# Patient Record
Sex: FEMALE | Race: WHITE | Employment: FULL TIME | URBAN - METROPOLITAN AREA
[De-identification: names, ages, dates, MRNs, and addresses within clinical notes are randomized per-mention and may not be internally consistent; named-entity substitution may affect disease eponyms.]

---

## 2017-03-21 ENCOUNTER — HOSPITAL ENCOUNTER (OUTPATIENT)
Dept: MAMMOGRAPHY | Age: 72
Discharge: HOME | End: 2017-03-21
Attending: FAMILY MEDICINE
Payer: COMMERCIAL

## 2017-03-21 DIAGNOSIS — Z78.0 POSTMENOPAUSAL: ICD-10-CM

## 2017-03-21 PROCEDURE — 77080 DXA BONE DENSITY AXIAL: CPT

## 2017-09-20 ENCOUNTER — HOSPITAL ENCOUNTER (OUTPATIENT)
Dept: PHYSICAL THERAPY | Age: 72
Discharge: HOME | End: 2017-09-20
Attending: FAMILY MEDICINE
Payer: COMMERCIAL

## 2017-09-20 DIAGNOSIS — G89.29 CHRONIC MIDLINE LOW BACK PAIN WITHOUT SCIATICA: ICD-10-CM

## 2017-09-20 DIAGNOSIS — M54.50 CHRONIC MIDLINE LOW BACK PAIN WITHOUT SCIATICA: ICD-10-CM

## 2017-09-20 PROCEDURE — 97162 PT EVAL MOD COMPLEX 30 MIN: CPT

## 2017-09-20 PROCEDURE — 97110 THERAPEUTIC EXERCISES: CPT

## 2017-09-20 NOTE — PROGRESS NOTES
Ambulatory/Rehab Services H2 Model Falls Risk Assessment    Risk Factor Pts. ·   Confusion/Disorientation/Impulsivity  []    4 ·   Symptomatic Depression  []   2 ·   Altered Elimination  []   1 ·   Dizziness/Vertigo  []   1 ·   Gender (Male)  []   1 ·   Any administered antiepileptics (anticonvulsants):  []   2 ·   Any administered benzodiazepines:  []   1 ·   Visual Impairment (specify):  []   1 ·   Portable Oxygen Use  []   1 ·   Orthostatic ? BP  []   1 ·   History of Recent Falls (within 3 mos.)  []   5     Ability to Rise from Chair (choose one) Pts. ·   Ability to rise in a single movement  [x]   0 ·   Pushes up, successful in one attempt  []   1 ·   Multiple attempts, but successful  []   3 ·   Unable to rise without assistance  []   4   Total: (5 or greater = High Risk) 0     Falls Prevention Plan:   []                Physical Limitations to Exercise (specify):   []                Mobility Assistance Device (type):   []                Exercise/Equipment Adaptation (specify):    ©2010 [facility] Patent #[id number].  Federal Law prohibits the replication, distribution or use without written permission from [facility] [employer]

## 2017-09-20 NOTE — PROGRESS NOTES
Pastor Chavarria  : 1945 [facility]  [address], [address], [place]  [phone]   [phone]         OUTPATIENT PHYSICAL THERAPY:Initial Assessment and Daily Note 2017    ICD-10: Treatment Diagnosis:Low back pain (M54.5); Muscle weakness (generalized) (M62.81); Unsteadiness on feet (R26.81)  Precautions/Allergies:   Review of patient's allergies indicates no known allergies. Fall Risk Score: 0 (? 5 = High Risk)  MD Orders: PT eval and tx MEDICAL/REFERRING DIAGNOSIS:  Chronic midline low back pain without sciatica [M54.5, G89.29]   DATE OF ONSET: Late   REFERRING PHYSICIAN: Mark Medina MD  RETURN PHYSICIAN APPOINTMENT: 6 months     INITIAL ASSESSMENT:  Ms. Savana Mendez presents to PT eval w/ c/o LBP that has been bothering her since she was in her 's. She has a history of a discectomy in  and her most recent MRI shows degenerative changes w/ stenosis. She displayed decreased lumbar motion, decreased balance, and decreased B LE strength. She will benefit from continued skilled PT services to improve her deficits listed below and to progress towards her PLOF. PROBLEM LIST (Impacting functional limitations):  1. Decreased Strength  2. Decreased ADL/Functional Activities  3. Decreased Transfer Abilities  4. Decreased Balance  5. Increased Pain  6. Decreased Activity Tolerance  7. Decreased Flexibility/Joint Mobility  8. Decreased [place] with Home Exercise Program INTERVENTIONS PLANNED:  1. Balance Exercise  2. Cold  3. Electrical Stimulation  4. Heat  5. Home Exercise Program (HEP)  6. Manual Therapy  7. Range of Motion (ROM)  8. Therapeutic Activites  9. Therapeutic Exercise/Strengthening   TREATMENT PLAN:  Effective Dates: 17 TO 18.   Frequency/Duration: 1 time a week for 8 weeks  GOALS: (Goals have been discussed and agreed upon with patient.)  Discharge Goals: Time Frame: 8 weeks  Pt will be independent w/ HEP in order to improve outcomes and decrease pain. Pt will report pain of 3/10 or less while walking in grocery store in order to improve functional mobility. Pt will have Oswestry score of 23 or less in order to report decreased pain and decreased functional impairments. Pt will have B LE strength of 4/5 or greater in all major muscle groups in order to increase her safety w/ functional mobility. Rehabilitation Potential For Stated Goals: Good  Regarding [place] Georgiana Winn's therapy, I certify that the treatment plan above will be carried out by a therapist or under their direction. Thank you for this referral,  Yandel Grove PT     Referring Physician Signature: Lisa Bahena MD              Date                    The information in this section was collected on 9/20/17 (except where otherwise noted). HISTORY:   History of Present Injury/Illness (Reason for Referral):  Pt has been having back pain since she was in her 19's. She had a discectomy in 1993 and her most recent MRI shows degenerative changes w/ stenosis. Past Medical History/Comorbidities:   Ms. Tito Sinha  has a past medical history of Chronic musculoskeletal pain; COPD; GERD (gastroesophageal reflux disease); GERD (gastroesophageal reflux disease); History of back surgery; History of colon polyps; History of left bundle branch block; History of tooth extraction; History of tubal ligation; Hypertension; Hypothyroidism; Menopause; OA (osteoarthritis); and Visit for dental examination (02/2017). She also has no past medical history of Difficult intubation; Malignant hyperthermia due to anesthesia; Nausea & vomiting; or Pseudocholinesterase deficiency. Ms. Tito Sinha  has a past surgical history that includes back surgery; heent; tubal ligation; colsc flx w/removal lesion by hot bx forceps (9/13/2013); colsc flx w/rmvl of tumor polyp lesion snare tq (12/19/2016); and colonoscopy (N/A, 12/19/2016).   Social History/Living Environment:     Lives w/ spouse in 1 story home w/ tub/shower combo. She has 6 steps to enter her home. Prior Level of Function/Work/Activity:  Works - clerical computer job full time   Dominant Side:         RIGHT    Current Medications:       Current Outpatient Prescriptions:     naproxen (NAPROSYN) 500 mg tablet, Take 1 Tab by mouth two (2) times daily (with meals). , Disp: 60 Tab, Rfl: 11    losartan (COZAAR) 100 mg tablet, Take 1 Tab by mouth daily for 90 days. , Disp: 90 Tab, Rfl: 3    levothyroxine (SYNTHROID) 150 mcg tablet, Take 1 Tab by mouth Daily (before breakfast). , Disp: 90 Tab, Rfl: 1    alendronate (FOSAMAX) 70 mg tablet, Take 1 Tab by mouth every seven (7) days. Indications: POST-MENOPAUSAL OSTEOPOROSIS, Disp: 12 Tab, Rfl: 3    atenolol (TENORMIN) 50 mg tablet, Take 2 Tabs by mouth daily for 90 days. , Disp: 180 Tab, Rfl: 3    BIOTIN PO, Take  by mouth., Disp: , Rfl:     omeprazole (PRILOSEC) 20 mg capsule, Take 20 mg by mouth every morning. Indications: GASTROESOPHAGEAL REFLUX, Disp: , Rfl:     multivitamin (ONE A DAY) tablet, Take 1 Tab by mouth nightly. Last 9/9/13, Disp: , Rfl:     aspirin 81 mg tablet, Take 81 mg by mouth every morning. Last 9/10/13, Disp: , Rfl:     calcium 500 mg Tab, Take  by mouth daily. , Disp: , Rfl:     ascorbic acid (VITAMIN C) 500 mg tablet, Take  by mouth daily. , Disp: , Rfl:    Date Last Reviewed:  9/20/2017   Number of Personal Factors/Comorbidities that affect the Plan of Care: 1-2: MODERATE COMPLEXITY   EXAMINATION:   Observation/Orthostatic Postural Assessment: Forward flexed in stand and sit, rounded shoulders  Palpation:          Tender in R hamstring and behind knee  Functional Mobility:         Gait/Ambulation:   Forward flexed, slow, antalgic        Transfers:  Able to rise w/o UE assist         Bed Mobility:  Extra time and effort required   Balance:          Decreased on B LEs <10sec   Sensation:         Decreased on L medial malleolus, otherwise intact w/ light touch to B LEs     Joint/Muscle ROM Strength    Hip flexion WFL R 4-/5, L 4/5    Hip extension Decreased R 3/5, L 3+/5    Hip abduction WFL R 4-/5, L 4/5    Knee flexion WFL 5/5 R and L    Knee extension 35* R and L hamstring stretch test 5/5 R and L    DF WFL 5/5 R and L    Lumbar Decreased extension Decreased core strength          Body Structures Involved:  1. Nerves  2. Bones  3. Joints  4. Muscles Body Functions Affected:  1. Sensory/Pain  2. Neuromusculoskeletal  3. Movement Related Activities and Participation Affected:  1. General Tasks and Demands  2. Mobility  3. Self Care  4. Domestic Life  5. Interpersonal Interactions and Relationships  6. Community, Social and [place]   Number of elements (examined above) that affect the Plan of Care: 3: MODERATE COMPLEXITY   CLINICAL PRESENTATION:   Presentation: Evolving clinical presentation with changing clinical characteristics: MODERATE COMPLEXITY   CLINICAL DECISION MAKING:   Outcome Measure: Tool Used: Modified Oswestry Low Back Pain Questionnaire  Score:  Initial: 28/50  Most Recent: X/50 (Date: -- )   Interpretation of Score: Each section is scored on a 0-5 scale, 5 representing the greatest disability. The scores of each section are added together for a total score of 50. Score 0 1-10 11-20 21-30 31-40 41-49 50   Modifier CH CI CJ CK CL CM CN     ? Mobility - Walking and Moving Around:     - CURRENT STATUS: CK - 40%-59% impaired, limited or restricted    - GOAL STATUS: CK - 40%-59% impaired, limited or restricted    - D/C STATUS:  ---------------To be determined---------------      Medical Necessity:   · Patient is expected to demonstrate progress in strength, range of motion and balance to increase independence with functional tasks. Reason for Services/Other Comments:  · Patient continues to demonstrate capacity to improve strength, ROM, balance which will increase independence.    Use of outcome tool(s) and clinical judgement create a POC that gives a: Questionable prediction of patient's progress: MODERATE COMPLEXITY            TREATMENT:   (In addition to Assessment/Re-Assessment sessions the following treatments were rendered)  Pre-treatment Symptoms/Complaints:  See above. Pain: Initial:   1/10 Post Session:  1/10     THERAPEUTIC EXERCISE: (15 minutes):  Exercises per grid below to improve mobility, strength and balance. Required minimal verbal and manual cues to promote proper body alignment, promote proper body posture and promote proper body mechanics. Progressed resistance, range, repetitions and complexity of movement as indicated. Date:  9/20/17 Date:   Date:     Activity/Exercise Parameters Parameters Parameters   HEP Issued      Repeated flexion in supine 10x2 (stronger)     Repeated flexion in sit 10x5 (strongest)     Repeated flexion in stand 10x1 (not the best)     Lower trunk rotation 10x each side     TA contraction 10x     Hamstring stretch 3x30 sec each side         Treatment/Session Assessment:    · Response to Treatment:  Pt's hip flexion strength increased the most w/ seated lumbar flexion. She displayed tightness w/ hamstring stretching this session. · Compliance with Program/Exercises: Will assess as treatment progresses. · Recommendations/Intent for next treatment session: \"Next visit will focus on advancements to more challenging activities\".   Variance from POC:  none  PT Patient Time In/Time Out  Time In: 0735  Time Out: 0830    Steve Diamond PT

## 2017-09-22 ENCOUNTER — APPOINTMENT (OUTPATIENT)
Dept: PHYSICAL THERAPY | Age: 72
End: 2017-09-22
Attending: FAMILY MEDICINE
Payer: COMMERCIAL

## 2017-09-25 ENCOUNTER — APPOINTMENT (OUTPATIENT)
Dept: PHYSICAL THERAPY | Age: 72
End: 2017-09-25
Attending: FAMILY MEDICINE
Payer: COMMERCIAL

## 2017-09-27 ENCOUNTER — HOSPITAL ENCOUNTER (OUTPATIENT)
Dept: PHYSICAL THERAPY | Age: 72
Discharge: HOME | End: 2017-09-27
Attending: FAMILY MEDICINE
Payer: COMMERCIAL

## 2017-09-27 PROCEDURE — 97012 MECHANICAL TRACTION THERAPY: CPT

## 2017-09-27 PROCEDURE — 97110 THERAPEUTIC EXERCISES: CPT

## 2017-09-27 NOTE — PROGRESS NOTES
Bonnie Chester  : 1945 [facility]  [address], [address], [place] [address]  [phone]   [phone]         OUTPATIENT PHYSICAL THERAPY:Initial Assessment and Daily Note 2017    ICD-10: Treatment Diagnosis:Low back pain (M54.5); Muscle weakness (generalized) (M62.81); Unsteadiness on feet (R26.81)  Precautions/Allergies:   Review of patient's allergies indicates no known allergies. Fall Risk Score: 0 (? 5 = High Risk)  MD Orders: PT eval and tx MEDICAL/REFERRING DIAGNOSIS:  low back pain   DATE OF ONSET: Late   REFERRING PHYSICIAN: Karine Barrios MD  RETURN PHYSICIAN APPOINTMENT: 6 months     INITIAL ASSESSMENT:  Ms. Derrek Rodriguez presents to PT eval w/ c/o LBP that has been bothering her since she was in her 21's. She has a history of a discectomy in  and her most recent MRI shows degenerative changes w/ stenosis. She displayed decreased lumbar motion, decreased balance, and decreased B LE strength. She will benefit from continued skilled PT services to improve her deficits listed below and to progress towards her PLOF. PROBLEM LIST (Impacting functional limitations):  1. Decreased Strength  2. Decreased ADL/Functional Activities  3. Decreased Transfer Abilities  4. Decreased Balance  5. Increased Pain  6. Decreased Activity Tolerance  7. Decreased Flexibility/Joint Mobility  8. Decreased [place] with Home Exercise Program INTERVENTIONS PLANNED:  1. Balance Exercise  2. Cold  3. Electrical Stimulation  4. Heat  5. Home Exercise Program (HEP)  6. Manual Therapy  7. Range of Motion (ROM)  8. Therapeutic Activites  9. Therapeutic Exercise/Strengthening   TREATMENT PLAN:  Effective Dates: 17 TO 18. Frequency/Duration: 1 time a week for 8 weeks  GOALS: (Goals have been discussed and agreed upon with patient.)  Discharge Goals: Time Frame: 8 weeks  Pt will be independent w/ HEP in order to improve outcomes and decrease pain.    Pt will report pain of 3/10 or less while walking in grocery store in order to improve functional mobility. Pt will have Oswestry score of 23 or less in order to report decreased pain and decreased functional impairments. Pt will have B LE strength of 4/5 or greater in all major muscle groups in order to increase her safety w/ functional mobility. Rehabilitation Potential For Stated Goals: Good  Regarding Brinda Kawasaki Orgeron's therapy, I certify that the treatment plan above will be carried out by a therapist or under their direction. Thank you for this referral,  Benjamín Siddiqi PT     Referring Physician Signature: Justin Schafer MD              Date                    The information in this section was collected on 9/20/17 (except where otherwise noted). HISTORY:   History of Present Injury/Illness (Reason for Referral):  Pt has been having back pain since she was in her 21's. She had a discectomy in 1993 and her most recent MRI shows degenerative changes w/ stenosis. Past Medical History/Comorbidities:   Ms. Nikkie Weathers  has a past medical history of Chronic musculoskeletal pain; COPD; GERD (gastroesophageal reflux disease); GERD (gastroesophageal reflux disease); History of back surgery; History of colon polyps; History of left bundle branch block; History of tooth extraction; History of tubal ligation; Hypertension; Hypothyroidism; Menopause; OA (osteoarthritis); and Visit for dental examination (02/2017). She also has no past medical history of Difficult intubation; Malignant hyperthermia due to anesthesia; Nausea & vomiting; or Pseudocholinesterase deficiency. Ms. Nikkie Weathers  has a past surgical history that includes back surgery; heent; tubal ligation; colsc flx w/removal lesion by hot bx forceps (9/13/2013); colsc flx w/rmvl of tumor polyp lesion snare tq (12/19/2016); and colonoscopy (N/A, 12/19/2016). Social History/Living Environment:     Lives w/ spouse in 1 story home w/ tub/shower combo.  She has 6 steps to enter her home. Prior Level of Function/Work/Activity:  Works - clerical computer job full time   Dominant Side:         RIGHT    Current Medications:       Current Outpatient Prescriptions:     naproxen (NAPROSYN) 500 mg tablet, Take 1 Tab by mouth two (2) times daily (with meals). , Disp: 60 Tab, Rfl: 11    losartan (COZAAR) 100 mg tablet, Take 1 Tab by mouth daily for 90 days. , Disp: 90 Tab, Rfl: 3    levothyroxine (SYNTHROID) 150 mcg tablet, Take 1 Tab by mouth Daily (before breakfast). , Disp: 90 Tab, Rfl: 1    alendronate (FOSAMAX) 70 mg tablet, Take 1 Tab by mouth every seven (7) days. Indications: POST-MENOPAUSAL OSTEOPOROSIS, Disp: 12 Tab, Rfl: 3    atenolol (TENORMIN) 50 mg tablet, Take 2 Tabs by mouth daily for 90 days. , Disp: 180 Tab, Rfl: 3    BIOTIN PO, Take  by mouth., Disp: , Rfl:     omeprazole (PRILOSEC) 20 mg capsule, Take 20 mg by mouth every morning. Indications: GASTROESOPHAGEAL REFLUX, Disp: , Rfl:     multivitamin (ONE A DAY) tablet, Take 1 Tab by mouth nightly. Last 9/9/13, Disp: , Rfl:     aspirin 81 mg tablet, Take 81 mg by mouth every morning. Last 9/10/13, Disp: , Rfl:     calcium 500 mg Tab, Take  by mouth daily. , Disp: , Rfl:     ascorbic acid (VITAMIN C) 500 mg tablet, Take  by mouth daily. , Disp: , Rfl:    Date Last Reviewed:  9/27/2017   Number of Personal Factors/Comorbidities that affect the Plan of Care: 1-2: MODERATE COMPLEXITY   EXAMINATION:   Observation/Orthostatic Postural Assessment: Forward flexed in stand and sit, rounded shoulders  Palpation:          Tender in R hamstring and behind knee  Functional Mobility:         Gait/Ambulation:   Forward flexed, slow, antalgic        Transfers:  Able to rise w/o UE assist         Bed Mobility:  Extra time and effort required   Balance:          Decreased on B LEs <10sec   Sensation:         Decreased on L medial malleolus, otherwise intact w/ light touch to B LEs     Joint/Muscle ROM Strength    Hip flexion WFL R 4-/5, L 4/5    Hip extension Decreased R 3/5, L 3+/5    Hip abduction WFL R 4-/5, L 4/5    Knee flexion WFL 5/5 R and L    Knee extension 35* R and L hamstring stretch test 5/5 R and L    DF WFL 5/5 R and L    Lumbar Decreased extension Decreased core strength          Body Structures Involved:  1. Nerves  2. Bones  3. Joints  4. Muscles Body Functions Affected:  1. Sensory/Pain  2. Neuromusculoskeletal  3. Movement Related Activities and Participation Affected:  1. General Tasks and Demands  2. Mobility  3. Self Care  4. Domestic Life  5. Interpersonal Interactions and Relationships  6. Community, Social and [place]   Number of elements (examined above) that affect the Plan of Care: 3: MODERATE COMPLEXITY   CLINICAL PRESENTATION:   Presentation: Evolving clinical presentation with changing clinical characteristics: MODERATE COMPLEXITY   CLINICAL DECISION MAKING:   Outcome Measure: Tool Used: Modified Oswestry Low Back Pain Questionnaire  Score:  Initial: 28/50  Most Recent: X/50 (Date: -- )   Interpretation of Score: Each section is scored on a 0-5 scale, 5 representing the greatest disability. The scores of each section are added together for a total score of 50. Score 0 1-10 11-20 21-30 31-40 41-49 50   Modifier CH CI CJ CK CL CM CN     ? Mobility - Walking and Moving Around:     - CURRENT STATUS: CK - 40%-59% impaired, limited or restricted    - GOAL STATUS: CK - 40%-59% impaired, limited or restricted    - D/C STATUS:  ---------------To be determined---------------      Medical Necessity:   · Patient is expected to demonstrate progress in strength, range of motion and balance to increase independence with functional tasks. Reason for Services/Other Comments:  · Patient continues to demonstrate capacity to improve strength, ROM, balance which will increase independence.    Use of outcome tool(s) and clinical judgement create a POC that gives a: Questionable prediction of patient's progress: MODERATE COMPLEXITY            TREATMENT:   (In addition to Assessment/Re-Assessment sessions the following treatments were rendered)  Pre-treatment Symptoms/Complaints:  Pt reports she was feeling better until she took a day to go shopping and get all her beauty routines done like hair and nails. Pain: Initial:   1/10 Post Session:  0/10     THERAPEUTIC EXERCISE: (30 minutes):  Exercises per grid below to improve mobility, strength and balance. Required minimal verbal and manual cues to promote proper body alignment, promote proper body posture and promote proper body mechanics. Progressed resistance, range, repetitions and complexity of movement as indicated. MECHANICAL TRACTION: (20 minutes): Traction was used due to the patient's lumbago in order to relieve pain in or originating from his spine. 85# max 80# min   Date:  9/20/17 Date:  9/27/17 Date:     Activity/Exercise Parameters Parameters Parameters   HEP Issued      Repeated flexion in supine 10x2 (stronger)     Repeated flexion in sit 10x5 (strongest) 10x    Repeated flexion in stand 10x1 (not the best)     Lower trunk rotation 10x each side 10x each side    TA contraction 10x     Hamstring stretch 3x30 sec each side 3x30 sec on B LEs in sit    Clamshells  3x10, YTB    SLR      Bridges      Leg press  75#, 30x    Mini squats  1x too much knee pain        Treatment/Session Assessment:    · Response to Treatment:  Pt displayed decreased LE strength and endurance w/ therex. Additionally, she displayed decreased balance and functional mobility. Some relief following traction this session. · Compliance with Program/Exercises: Will assess as treatment progresses. · Recommendations/Intent for next treatment session: \"Next visit will focus on advancements to more challenging activities\".   Variance from POC:  none  PT Patient Time In/Time Out  Time In: 0730  Time Out: 0828    Ramila Ledesma PT

## 2017-10-04 ENCOUNTER — HOSPITAL ENCOUNTER (OUTPATIENT)
Dept: PHYSICAL THERAPY | Age: 72
Discharge: HOME | End: 2017-10-04
Attending: FAMILY MEDICINE
Payer: COMMERCIAL

## 2017-10-04 PROCEDURE — 97110 THERAPEUTIC EXERCISES: CPT

## 2017-10-04 NOTE — PROGRESS NOTES
Jania Nayak  : 1945 [facility]  [address], [address], [place]  [phone]   [phone]         OUTPATIENT PHYSICAL THERAPY:Initial Assessment and Daily Note 10/4/2017    ICD-10: Treatment Diagnosis:Low back pain (M54.5); Muscle weakness (generalized) (M62.81); Unsteadiness on feet (R26.81)  Precautions/Allergies:   Review of patient's allergies indicates no known allergies. Fall Risk Score: 0 (? 5 = High Risk)  MD Orders: PT eval and tx MEDICAL/REFERRING DIAGNOSIS:  low back pain   DATE OF ONSET: Late   REFERRING PHYSICIAN: Jory Ivan MD  RETURN PHYSICIAN APPOINTMENT: 6 months     INITIAL ASSESSMENT:  Ms. Ana Rascon presents to PT eval w/ c/o LBP that has been bothering her since she was in her 21's. She has a history of a discectomy in  and her most recent MRI shows degenerative changes w/ stenosis. She displayed decreased lumbar motion, decreased balance, and decreased B LE strength. She will benefit from continued skilled PT services to improve her deficits listed below and to progress towards her PLOF. PROBLEM LIST (Impacting functional limitations):  1. Decreased Strength  2. Decreased ADL/Functional Activities  3. Decreased Transfer Abilities  4. Decreased Balance  5. Increased Pain  6. Decreased Activity Tolerance  7. Decreased Flexibility/Joint Mobility  8. Decreased [place] with Home Exercise Program INTERVENTIONS PLANNED:  1. Balance Exercise  2. Cold  3. Electrical Stimulation  4. Heat  5. Home Exercise Program (HEP)  6. Manual Therapy  7. Range of Motion (ROM)  8. Therapeutic Activites  9. Therapeutic Exercise/Strengthening   TREATMENT PLAN:  Effective Dates: 17 TO 18. Frequency/Duration: 1 time a week for 8 weeks  GOALS: (Goals have been discussed and agreed upon with patient.)  Discharge Goals: Time Frame: 8 weeks  Pt will be independent w/ HEP in order to improve outcomes and decrease pain.    Pt will report pain of 3/10 or less while walking in grocery store in order to improve functional mobility. Pt will have Oswestry score of 23 or less in order to report decreased pain and decreased functional impairments. Pt will have B LE strength of 4/5 or greater in all major muscle groups in order to increase her safety w/ functional mobility. Rehabilitation Potential For Stated Goals: Good  Regarding Nate Winn's therapy, I certify that the treatment plan above will be carried out by a therapist or under their direction. Thank you for this referral,  Chester Mcburney, PT     Referring Physician Signature: Jenniffer Dinero MD              Date                    The information in this section was collected on 9/20/17 (except where otherwise noted). HISTORY:   History of Present Injury/Illness (Reason for Referral):  Pt has been having back pain since she was in her 21's. She had a discectomy in 1993 and her most recent MRI shows degenerative changes w/ stenosis. Past Medical History/Comorbidities:   Ms. Chinyere Jackson  has a past medical history of Chronic musculoskeletal pain; COPD; GERD (gastroesophageal reflux disease); GERD (gastroesophageal reflux disease); History of back surgery; History of colon polyps; History of left bundle branch block; History of tooth extraction; History of tubal ligation; Hypertension; Hypothyroidism; Menopause; OA (osteoarthritis); and Visit for dental examination (02/2017). She also has no past medical history of Difficult intubation; Malignant hyperthermia due to anesthesia; Nausea & vomiting; or Pseudocholinesterase deficiency. Ms. Chinyere Jackson  has a past surgical history that includes back surgery; heent; tubal ligation; colsc flx w/removal lesion by hot bx forceps (9/13/2013); colsc flx w/rmvl of tumor polyp lesion snare tq (12/19/2016); and colonoscopy (N/A, 12/19/2016). Social History/Living Environment:     Lives w/ spouse in 1 story home w/ tub/shower combo.  She has 6 steps to enter her home. Prior Level of Function/Work/Activity:  Works - clerical computer job full time   Dominant Side:         RIGHT    Current Medications:       Current Outpatient Prescriptions:     naproxen (NAPROSYN) 500 mg tablet, Take 1 Tab by mouth two (2) times daily (with meals). , Disp: 60 Tab, Rfl: 11    losartan (COZAAR) 100 mg tablet, Take 1 Tab by mouth daily for 90 days. , Disp: 90 Tab, Rfl: 3    levothyroxine (SYNTHROID) 150 mcg tablet, Take 1 Tab by mouth Daily (before breakfast). , Disp: 90 Tab, Rfl: 1    alendronate (FOSAMAX) 70 mg tablet, Take 1 Tab by mouth every seven (7) days. Indications: POST-MENOPAUSAL OSTEOPOROSIS, Disp: 12 Tab, Rfl: 3    atenolol (TENORMIN) 50 mg tablet, Take 2 Tabs by mouth daily for 90 days. , Disp: 180 Tab, Rfl: 3    BIOTIN PO, Take  by mouth., Disp: , Rfl:     omeprazole (PRILOSEC) 20 mg capsule, Take 20 mg by mouth every morning. Indications: GASTROESOPHAGEAL REFLUX, Disp: , Rfl:     multivitamin (ONE A DAY) tablet, Take 1 Tab by mouth nightly. Last 9/9/13, Disp: , Rfl:     aspirin 81 mg tablet, Take 81 mg by mouth every morning. Last 9/10/13, Disp: , Rfl:     calcium 500 mg Tab, Take  by mouth daily. , Disp: , Rfl:     ascorbic acid (VITAMIN C) 500 mg tablet, Take  by mouth daily. , Disp: , Rfl:    Date Last Reviewed:  10/4/2017   Number of Personal Factors/Comorbidities that affect the Plan of Care: 1-2: MODERATE COMPLEXITY   EXAMINATION:   Observation/Orthostatic Postural Assessment: Forward flexed in stand and sit, rounded shoulders  Palpation:          Tender in R hamstring and behind knee  Functional Mobility:         Gait/Ambulation:   Forward flexed, slow, antalgic        Transfers:  Able to rise w/o UE assist         Bed Mobility:  Extra time and effort required   Balance:          Decreased on B LEs <10sec   Sensation:         Decreased on L medial malleolus, otherwise intact w/ light touch to B LEs     Joint/Muscle ROM Strength    Hip flexion WFL R 4-/5, L 4/5    Hip extension Decreased R 3/5, L 3+/5    Hip abduction WFL R 4-/5, L 4/5    Knee flexion WFL 5/5 R and L    Knee extension 35* R and L hamstring stretch test 5/5 R and L    DF WFL 5/5 R and L    Lumbar Decreased extension Decreased core strength          Body Structures Involved:  1. Nerves  2. Bones  3. Joints  4. Muscles Body Functions Affected:  1. Sensory/Pain  2. Neuromusculoskeletal  3. Movement Related Activities and Participation Affected:  1. General Tasks and Demands  2. Mobility  3. Self Care  4. Domestic Life  5. Interpersonal Interactions and Relationships  6. Community, Social and [place]   Number of elements (examined above) that affect the Plan of Care: 3: MODERATE COMPLEXITY   CLINICAL PRESENTATION:   Presentation: Evolving clinical presentation with changing clinical characteristics: MODERATE COMPLEXITY   CLINICAL DECISION MAKING:   Outcome Measure: Tool Used: Modified Oswestry Low Back Pain Questionnaire  Score:  Initial: 28/50  Most Recent: X/50 (Date: -- )   Interpretation of Score: Each section is scored on a 0-5 scale, 5 representing the greatest disability. The scores of each section are added together for a total score of 50. Score 0 1-10 11-20 21-30 31-40 41-49 50   Modifier CH CI CJ CK CL CM CN     ? Mobility - Walking and Moving Around:     - CURRENT STATUS: CK - 40%-59% impaired, limited or restricted    - GOAL STATUS: CK - 40%-59% impaired, limited or restricted    - D/C STATUS:  ---------------To be determined---------------      Medical Necessity:   · Patient is expected to demonstrate progress in strength, range of motion and balance to increase independence with functional tasks. Reason for Services/Other Comments:  · Patient continues to demonstrate capacity to improve strength, ROM, balance which will increase independence.    Use of outcome tool(s) and clinical judgement create a POC that gives a: Questionable prediction of patient's progress: MODERATE COMPLEXITY            TREATMENT:   (In addition to Assessment/Re-Assessment sessions the following treatments were rendered)  Pre-treatment Symptoms/Complaints:  Pt reports she did a lot of flexion cleaning and her back felt better after that, but she woke up this morning and felt stiff. Notes her knees have been bothering her. Pain: Initial:   0/10 Post Session:  0/10     THERAPEUTIC EXERCISE: (40 minutes):  Exercises per grid below to improve mobility, strength, balance and dynamic movement of back - bilateral and hip - bilateral to improve functional bending and lifting. Required minimal verbal and manual cues to promote proper body alignment, promote proper body posture and promote proper body mechanics. Progressed resistance, range, repetitions and complexity of movement as indicated. MANUAL THERAPY: (5 minutes): Joint mobilization was utilized and necessary because of the patient's restricted joint motion. Long axis traction - grades 2-3 on R and L LE   Date:  9/20/17 Date:  9/27/17 Date:  10/4/17   Activity/Exercise Parameters Parameters Parameters   HEP Issued      Repeated flexion in supine 10x2 (stronger)  20x w/ OP   Repeated flexion in sit 10x5 (strongest) 10x 10x   Repeated flexion in stand 10x1 (not the best)     Lower trunk rotation 10x each side 10x each side 10x each side   TA contraction 10x  2x10   Hamstring stretch 3x30 sec each side 3x30 sec on B LEs in sit 3x30 sec on B LEs in sit    Clamshells  3x10, YTB 3x10, YTB   SLR   3x10, BLEs   Bridges      Leg press  75#, 30x 75#, 40x   Mini squats  1x too much knee pain ------------------   NuStep   10 mins, 2.5   Cat camel      Prayer stretch   Attempted - unable   Dead bugs   10x       Treatment/Session Assessment:    · Response to Treatment:  Pt displayed decreased LE strength and endurance w/ therex. She displayed an improved gait pattern and decreased pain in her legs following therapy.  She noted it was the best she has felt in a while and started dancing. ·  Compliance with Program/Exercises: Will assess as treatment progresses. · Recommendations/Intent for next treatment session: \"Next visit will focus on advancements to more challenging activities\".   Variance from POC:  none  PT Patient Time In/Time Out  Time In: 0730  Time Out: 0826    Juan Nuñez, PT

## 2017-10-12 ENCOUNTER — HOSPITAL ENCOUNTER (OUTPATIENT)
Dept: PHYSICAL THERAPY | Age: 72
Discharge: HOME | End: 2017-10-12
Attending: FAMILY MEDICINE
Payer: COMMERCIAL

## 2017-10-12 PROCEDURE — 97140 MANUAL THERAPY 1/> REGIONS: CPT

## 2017-10-12 PROCEDURE — 97110 THERAPEUTIC EXERCISES: CPT

## 2017-10-12 NOTE — PROGRESS NOTES
Chandni Goldman  : 1945 [facility] [address]  [address], [address], [place] [address]  [phone]   [phone]         OUTPATIENT PHYSICAL THERAPY:Initial Assessment and Daily Note 10/12/2017    ICD-10: Treatment Diagnosis:Low back pain (M54.5); Muscle weakness (generalized) (M62.81); Unsteadiness on feet (R26.81)  Precautions/Allergies:   Review of patient's allergies indicates no known allergies. Fall Risk Score: 0 (? 5 = High Risk)  MD Orders: PT eval and tx MEDICAL/REFERRING DIAGNOSIS:  low back pain   DATE OF ONSET: Late   REFERRING PHYSICIAN: Brandee Rinaldi MD  RETURN PHYSICIAN APPOINTMENT: 6 months     INITIAL ASSESSMENT:  Ms. Marisa Sullivan presents to PT eval w/ c/o LBP that has been bothering her since she was in her 's. She has a history of a discectomy in  and her most recent MRI shows degenerative changes w/ stenosis. She displayed decreased lumbar motion, decreased balance, and decreased B LE strength. She will benefit from continued skilled PT services to improve her deficits listed below and to progress towards her PLOF. PROBLEM LIST (Impacting functional limitations):  1. Decreased Strength  2. Decreased ADL/Functional Activities  3. Decreased Transfer Abilities  4. Decreased Balance  5. Increased Pain  6. Decreased Activity Tolerance  7. Decreased Flexibility/Joint Mobility  8. Decreased [place] with Home Exercise Program INTERVENTIONS PLANNED:  1. Balance Exercise  2. Cold  3. Electrical Stimulation  4. Heat  5. Home Exercise Program (HEP)  6. Manual Therapy  7. Range of Motion (ROM)  8. Therapeutic Activites  9. Therapeutic Exercise/Strengthening   TREATMENT PLAN:  Effective Dates: 17 TO 18. Frequency/Duration: 1 time a week for 8 weeks  GOALS: (Goals have been discussed and agreed upon with patient.)  Discharge Goals: Time Frame: 8 weeks  Pt will be independent w/ HEP in order to improve outcomes and decrease pain. Pt will report pain of 3/10 or less while walking in grocery store in order to improve functional mobility. Pt will have Oswestry score of 23 or less in order to report decreased pain and decreased functional impairments. Pt will have B LE strength of 4/5 or greater in all major muscle groups in order to increase her safety w/ functional mobility. Rehabilitation Potential For Stated Goals: Good  Regarding Damari Winn's therapy, I certify that the treatment plan above will be carried out by a therapist or under their direction. Thank you for this referral,  Igor Jin PT     Referring Physician Signature: Navid Holder MD              Date                    The information in this section was collected on 9/20/17 (except where otherwise noted). HISTORY:   History of Present Injury/Illness (Reason for Referral):  Pt has been having back pain since she was in her 19's. She had a discectomy in 1993 and her most recent MRI shows degenerative changes w/ stenosis. Past Medical History/Comorbidities:   Ms. Shabbir Pugh  has a past medical history of Chronic musculoskeletal pain; COPD; GERD (gastroesophageal reflux disease); GERD (gastroesophageal reflux disease); History of back surgery; History of colon polyps; History of left bundle branch block; History of tooth extraction; History of tubal ligation; Hypertension; Hypothyroidism; Menopause; OA (osteoarthritis); and Visit for dental examination (02/2017). She also has no past medical history of Difficult intubation; Malignant hyperthermia due to anesthesia; Nausea & vomiting; or Pseudocholinesterase deficiency. Ms. Shabbir Pugh  has a past surgical history that includes back surgery; heent; tubal ligation; colsc flx w/removal lesion by hot bx forceps (9/13/2013); colsc flx w/rmvl of tumor polyp lesion snare tq (12/19/2016); and colonoscopy (N/A, 12/19/2016). Social History/Living Environment:     Lives w/ spouse in 1 story home w/ tub/shower combo.  She has 6 steps to enter her home. Prior Level of Function/Work/Activity:  Works - clerical computer job full time   Dominant Side:         RIGHT    Current Medications:       Current Outpatient Prescriptions:     naproxen (NAPROSYN) 500 mg tablet, Take 1 Tab by mouth two (2) times daily (with meals). , Disp: 60 Tab, Rfl: 11    losartan (COZAAR) 100 mg tablet, Take 1 Tab by mouth daily for 90 days. , Disp: 90 Tab, Rfl: 3    levothyroxine (SYNTHROID) 150 mcg tablet, Take 1 Tab by mouth Daily (before breakfast). , Disp: 90 Tab, Rfl: 1    alendronate (FOSAMAX) 70 mg tablet, Take 1 Tab by mouth every seven (7) days. Indications: POST-MENOPAUSAL OSTEOPOROSIS, Disp: 12 Tab, Rfl: 3    atenolol (TENORMIN) 50 mg tablet, Take 2 Tabs by mouth daily for 90 days. , Disp: 180 Tab, Rfl: 3    BIOTIN PO, Take  by mouth., Disp: , Rfl:     omeprazole (PRILOSEC) 20 mg capsule, Take 20 mg by mouth every morning. Indications: GASTROESOPHAGEAL REFLUX, Disp: , Rfl:     multivitamin (ONE A DAY) tablet, Take 1 Tab by mouth nightly. Last 9/9/13, Disp: , Rfl:     aspirin 81 mg tablet, Take 81 mg by mouth every morning. Last 9/10/13, Disp: , Rfl:     calcium 500 mg Tab, Take  by mouth daily. , Disp: , Rfl:     ascorbic acid (VITAMIN C) 500 mg tablet, Take  by mouth daily. , Disp: , Rfl:    Date Last Reviewed:  10/12/2017   Number of Personal Factors/Comorbidities that affect the Plan of Care: 1-2: MODERATE COMPLEXITY   EXAMINATION:   Observation/Orthostatic Postural Assessment: Forward flexed in stand and sit, rounded shoulders  Palpation:          Tender in R hamstring and behind knee  Functional Mobility:         Gait/Ambulation:   Forward flexed, slow, antalgic        Transfers:  Able to rise w/o UE assist         Bed Mobility:  Extra time and effort required   Balance:          Decreased on B LEs <10sec   Sensation:         Decreased on L medial malleolus, otherwise intact w/ light touch to B LEs     Joint/Muscle ROM Strength Hip flexion WFL R 4-/5, L 4/5    Hip extension Decreased R 3/5, L 3+/5    Hip abduction WFL R 4-/5, L 4/5    Knee flexion WFL 5/5 R and L    Knee extension 35* R and L hamstring stretch test 5/5 R and L    DF WFL 5/5 R and L    Lumbar Decreased extension Decreased core strength          Body Structures Involved:  1. Nerves  2. Bones  3. Joints  4. Muscles Body Functions Affected:  1. Sensory/Pain  2. Neuromusculoskeletal  3. Movement Related Activities and Participation Affected:  1. General Tasks and Demands  2. Mobility  3. Self Care  4. Domestic Life  5. Interpersonal Interactions and Relationships  6. Community, Social and [place]   Number of elements (examined above) that affect the Plan of Care: 3: MODERATE COMPLEXITY   CLINICAL PRESENTATION:   Presentation: Evolving clinical presentation with changing clinical characteristics: MODERATE COMPLEXITY   CLINICAL DECISION MAKING:   Outcome Measure: Tool Used: Modified Oswestry Low Back Pain Questionnaire  Score:  Initial: 28/50  Most Recent: X/50 (Date: -- )   Interpretation of Score: Each section is scored on a 0-5 scale, 5 representing the greatest disability. The scores of each section are added together for a total score of 50. Score 0 1-10 11-20 21-30 31-40 41-49 50   Modifier CH CI CJ CK CL CM CN     ? Mobility - Walking and Moving Around:     - CURRENT STATUS: CK - 40%-59% impaired, limited or restricted    - GOAL STATUS: CK - 40%-59% impaired, limited or restricted    - D/C STATUS:  ---------------To be determined---------------      Medical Necessity:   · Patient is expected to demonstrate progress in strength, range of motion and balance to increase independence with functional tasks. Reason for Services/Other Comments:  · Patient continues to demonstrate capacity to improve strength, ROM, balance which will increase independence.    Use of outcome tool(s) and clinical judgement create a POC that gives a: Questionable prediction of patient's progress: MODERATE COMPLEXITY            TREATMENT:   (In addition to Assessment/Re-Assessment sessions the following treatments were rendered)  Pre-treatment Symptoms/Complaints:  Pt reports she has no back pain but her legs are very achy. Pain: Initial:   5/10 in legs Post Session:  0/10     THERAPEUTIC EXERCISE: (35 minutes):  Exercises per grid below to improve mobility, strength, balance and dynamic movement of back - bilateral and hip - bilateral to improve functional bending and lifting. Required minimal verbal and manual cues to promote proper body alignment, promote proper body posture and promote proper body mechanics. Progressed resistance, range, repetitions and complexity of movement as indicated. MANUAL THERAPY: (10 minutes): Joint mobilization was utilized and necessary because of the patient's restricted joint motion. MODALITIES: (10 minutes): *  Hot Pack Therapy in order to provide analgesia and relieve muscle spasm.   (INDIRECT)   Long axis traction - grades 2-3 on R and L LE   Date:  9/20/17 Date:  9/27/17 Date:  10/4/17 Date:  10/12/17   Activity/Exercise Parameters Parameters Parameters Parameters   HEP Issued       Repeated flexion in supine 10x2 (stronger)  20x w/ OP 2x20x w/ OP   Repeated flexion in sit 10x5 (strongest) 10x 10x    Repeated flexion in stand 10x1 (not the best)      Lower trunk rotation 10x each side 10x each side 10x each side 10x each side   TA contraction 10x  2x10 2x10   Hamstring stretch 3x30 sec each side 3x30 sec on B LEs in sit 3x30 sec on B LEs in sit  3x30 sec on B LEs in sit       Clamshells  3x10, YTB 3x10, YTB 3x10, YTB   SLR   3x10, BLEs    Bridges    15x, YTB   Leg press  75#, 30x 75#, 40x 75#, 40x   Mini squats  1x too much knee pain ------------------    NuStep   10 mins, 2.5 10 mins, 2.5   Cat camel       Prayer stretch   Attempted - unable    Dead bugs   10x        Treatment/Session Assessment:    · Response to Treatment:  Pt displayed decreased LE strength and endurance w/ therex. She displayed decreased hip abductor strength and knocked knees w/ gait. ·  Compliance with Program/Exercises: Will assess as treatment progresses. · Recommendations/Intent for next treatment session: \"Next visit will focus on advancements to more challenging activities\".   Variance from POC:  none  PT Patient Time In/Time Out  Time In: 0728  Time Out: 0828    Chester Mcburney, PT

## 2017-10-17 ENCOUNTER — HOSPITAL ENCOUNTER (OUTPATIENT)
Dept: PHYSICAL THERAPY | Age: 72
Discharge: HOME | End: 2017-10-17
Attending: FAMILY MEDICINE
Payer: COMMERCIAL

## 2017-10-17 PROCEDURE — 97012 MECHANICAL TRACTION THERAPY: CPT

## 2017-10-17 PROCEDURE — 97110 THERAPEUTIC EXERCISES: CPT

## 2017-10-17 NOTE — PROGRESS NOTES
Aba Reid  : 1945 [facility]  [facility] [address], [place] [address]  [phone]   [phone]         OUTPATIENT PHYSICAL THERAPY:Initial Assessment and Daily Note 10/17/2017    ICD-10: Treatment Diagnosis:Low back pain (M54.5); Muscle weakness (generalized) (M62.81); Unsteadiness on feet (R26.81)  Precautions/Allergies:   Review of patient's allergies indicates no known allergies. Fall Risk Score: 0 (? 5 = High Risk)  MD Orders: PT eval and tx MEDICAL/REFERRING DIAGNOSIS:  low back pain   DATE OF ONSET: Late   REFERRING PHYSICIAN: Sandra Shah MD  RETURN PHYSICIAN APPOINTMENT: 6 months     INITIAL ASSESSMENT:  Ms. Kingston Mena presents to PT eval w/ c/o LBP that has been bothering her since she was in her 21's. She has a history of a discectomy in  and her most recent MRI shows degenerative changes w/ stenosis. She displayed decreased lumbar motion, decreased balance, and decreased B LE strength. She will benefit from continued skilled PT services to improve her deficits listed below and to progress towards her PLOF. PROBLEM LIST (Impacting functional limitations):  1. Decreased Strength  2. Decreased ADL/Functional Activities  3. Decreased Transfer Abilities  4. Decreased Balance  5. Increased Pain  6. Decreased Activity Tolerance  7. Decreased Flexibility/Joint Mobility  8. Decreased [place] with Home Exercise Program INTERVENTIONS PLANNED:  1. Balance Exercise  2. Cold  3. Electrical Stimulation  4. Heat  5. Home Exercise Program (HEP)  6. Manual Therapy  7. Range of Motion (ROM)  8. Therapeutic Activites  9. Therapeutic Exercise/Strengthening   TREATMENT PLAN:  Effective Dates: 17 TO 18. Frequency/Duration: 1 time a week for 8 weeks  GOALS: (Goals have been discussed and agreed upon with patient.)  Discharge Goals: Time Frame: 8 weeks  Pt will be independent w/ HEP in order to improve outcomes and decrease pain. Pt will report pain of 3/10 or less while walking in grocery store in order to improve functional mobility. Pt will have Oswestry score of 23 or less in order to report decreased pain and decreased functional impairments. Pt will have B LE strength of 4/5 or greater in all major muscle groups in order to increase her safety w/ functional mobility. Rehabilitation Potential For Stated Goals: Good  Regarding Irineo Bosworth Orgeron's therapy, I certify that the treatment plan above will be carried out by a therapist or under their direction. Thank you for this referral,  Nicolás Lang, PT     Referring Physician Signature: Janet Feliciano MD              Date                    The information in this section was collected on 9/20/17 (except where otherwise noted). HISTORY:   History of Present Injury/Illness (Reason for Referral):  Pt has been having back pain since she was in her 21's. She had a discectomy in 1993 and her most recent MRI shows degenerative changes w/ stenosis. Past Medical History/Comorbidities:   Ms. Leon Tobar  has a past medical history of Chronic musculoskeletal pain; COPD; GERD (gastroesophageal reflux disease); GERD (gastroesophageal reflux disease); History of back surgery; History of colon polyps; History of left bundle branch block; History of tooth extraction; History of tubal ligation; Hypertension; Hypothyroidism; Menopause; OA (osteoarthritis); and Visit for dental examination (02/2017). She also has no past medical history of Difficult intubation; Malignant hyperthermia due to anesthesia; Nausea & vomiting; or Pseudocholinesterase deficiency. Ms. Leon Tobar  has a past surgical history that includes back surgery; heent; tubal ligation; colsc flx w/removal lesion by hot bx forceps (9/13/2013); colsc flx w/rmvl of tumor polyp lesion snare tq (12/19/2016); and colonoscopy (N/A, 12/19/2016). Social History/Living Environment:     Lives w/ spouse in 1 story home w/ tub/shower combo.  She has 6 steps to enter her home. Prior Level of Function/Work/Activity:  Works - clerical computer job full time   Dominant Side:         RIGHT    Current Medications:       Current Outpatient Prescriptions:     naproxen (NAPROSYN) 500 mg tablet, Take 1 Tab by mouth two (2) times daily (with meals). , Disp: 60 Tab, Rfl: 11    losartan (COZAAR) 100 mg tablet, Take 1 Tab by mouth daily for 90 days. , Disp: 90 Tab, Rfl: 3    levothyroxine (SYNTHROID) 150 mcg tablet, Take 1 Tab by mouth Daily (before breakfast). , Disp: 90 Tab, Rfl: 1    alendronate (FOSAMAX) 70 mg tablet, Take 1 Tab by mouth every seven (7) days. Indications: POST-MENOPAUSAL OSTEOPOROSIS, Disp: 12 Tab, Rfl: 3    atenolol (TENORMIN) 50 mg tablet, Take 2 Tabs by mouth daily for 90 days. , Disp: 180 Tab, Rfl: 3    BIOTIN PO, Take  by mouth., Disp: , Rfl:     omeprazole (PRILOSEC) 20 mg capsule, Take 20 mg by mouth every morning. Indications: GASTROESOPHAGEAL REFLUX, Disp: , Rfl:     multivitamin (ONE A DAY) tablet, Take 1 Tab by mouth nightly. Last 9/9/13, Disp: , Rfl:     aspirin 81 mg tablet, Take 81 mg by mouth every morning. Last 9/10/13, Disp: , Rfl:     calcium 500 mg Tab, Take  by mouth daily. , Disp: , Rfl:     ascorbic acid (VITAMIN C) 500 mg tablet, Take  by mouth daily. , Disp: , Rfl:    Date Last Reviewed:  10/17/2017   Number of Personal Factors/Comorbidities that affect the Plan of Care: 1-2: MODERATE COMPLEXITY   EXAMINATION:   Observation/Orthostatic Postural Assessment: Forward flexed in stand and sit, rounded shoulders  Palpation:          Tender in R hamstring and behind knee  Functional Mobility:         Gait/Ambulation:   Forward flexed, slow, antalgic        Transfers:  Able to rise w/o UE assist         Bed Mobility:  Extra time and effort required   Balance:          Decreased on B LEs <10sec   Sensation:         Decreased on L medial malleolus, otherwise intact w/ light touch to B LEs     Joint/Muscle ROM Strength Hip flexion WFL R 4-/5, L 4/5    Hip extension Decreased R 3/5, L 3+/5    Hip abduction WFL R 4-/5, L 4/5    Knee flexion WFL 5/5 R and L    Knee extension 35* R and L hamstring stretch test 5/5 R and L    DF WFL 5/5 R and L    Lumbar Decreased extension Decreased core strength          Body Structures Involved:  1. Nerves  2. Bones  3. Joints  4. Muscles Body Functions Affected:  1. Sensory/Pain  2. Neuromusculoskeletal  3. Movement Related Activities and Participation Affected:  1. General Tasks and Demands  2. Mobility  3. Self Care  4. Domestic Life  5. Interpersonal Interactions and Relationships  6. Community, Social and [place]   Number of elements (examined above) that affect the Plan of Care: 3: MODERATE COMPLEXITY   CLINICAL PRESENTATION:   Presentation: Evolving clinical presentation with changing clinical characteristics: MODERATE COMPLEXITY   CLINICAL DECISION MAKING:   Outcome Measure: Tool Used: Modified Oswestry Low Back Pain Questionnaire  Score:  Initial: 28/50  Most Recent: X/50 (Date: -- )   Interpretation of Score: Each section is scored on a 0-5 scale, 5 representing the greatest disability. The scores of each section are added together for a total score of 50. Score 0 1-10 11-20 21-30 31-40 41-49 50   Modifier CH CI CJ CK CL CM CN     ? Mobility - Walking and Moving Around:     - CURRENT STATUS: CK - 40%-59% impaired, limited or restricted    - GOAL STATUS: CK - 40%-59% impaired, limited or restricted    - D/C STATUS:  ---------------To be determined---------------      Medical Necessity:   · Patient is expected to demonstrate progress in strength, range of motion and balance to increase independence with functional tasks. Reason for Services/Other Comments:  · Patient continues to demonstrate capacity to improve strength, ROM, balance which will increase independence.    Use of outcome tool(s) and clinical judgement create a POC that gives a: Questionable prediction of patient's progress: MODERATE COMPLEXITY            TREATMENT:   (In addition to Assessment/Re-Assessment sessions the following treatments were rendered)  Pre-treatment Symptoms/Complaints:  Pt reports she woke up feeling pretty good today and doesn't really know why. Pain: Initial:   2/10  Post Session:  1/10     THERAPEUTIC EXERCISE: (25 minutes):  Exercises per grid below to improve mobility, strength, balance and dynamic movement of back - bilateral and hip - bilateral to improve functional bending and lifting. Required minimal verbal and manual cues to promote proper body alignment, promote proper body posture and promote proper body mechanics. Progressed resistance, range, repetitions and complexity of movement as indicated. MECHANICAL TRACTION: (20 minutes): Traction was used due to the patient's lumbago in order to relieve pain in or originating from his spine.    90# max, 85# min     Date:  9/20/17 Date:  9/27/17 Date:  10/4/17 Date:  10/12/17 Date:  10/17/17   Activity/Exercise Parameters Parameters Parameters Parameters Parameters   HEP Issued        Repeated flexion in supine 10x2 (stronger)  20x w/ OP 2x20x w/ OP 3x10 w/ OP   Repeated flexion in sit 10x5 (strongest) 10x 10x     Repeated flexion in stand 10x1 (not the best)       Lower trunk rotation 10x each side 10x each side 10x each side 10x each side 10x each side   TA contraction 10x  2x10 2x10 2x10   Hamstring stretch 3x30 sec each side 3x30 sec on B LEs in sit 3x30 sec on B LEs in sit  3x30 sec on B LEs in sit     3x30 s ec on B LEs, supine   Clamshells  3x10, YTB 3x10, YTB 3x10, YTB 2x10, YTB   SLR   3x10, BLEs     Bridges    15x, YTB    Leg press  75#, 30x 75#, 40x 75#, 40x    Mini squats  1x too much knee pain ------------------     NuStep   10 mins, 2.5 10 mins, 2.5 10 mins, 2.5   Cat camel        Prayer stretch   Attempted - unable     Dead bugs   10x         Treatment/Session Assessment:    · Response to Treatment:  Pt fatigued w/ luci this session. She reported feeling much better and more relaxed following traction. ·  Compliance with Program/Exercises: Will assess as treatment progresses. · Recommendations/Intent for next treatment session: \"Next visit will focus on advancements to more challenging activities\".   Variance from POC:  none  PT Patient Time In/Time Out  Time In: 0730  Time Out: 0816    Jovani Loredo, PT

## 2017-10-26 ENCOUNTER — HOSPITAL ENCOUNTER (OUTPATIENT)
Dept: PHYSICAL THERAPY | Age: 72
Discharge: HOME | End: 2017-10-26
Attending: FAMILY MEDICINE
Payer: COMMERCIAL

## 2017-10-26 PROCEDURE — 97110 THERAPEUTIC EXERCISES: CPT

## 2017-10-26 PROCEDURE — 97012 MECHANICAL TRACTION THERAPY: CPT

## 2017-11-01 ENCOUNTER — HOSPITAL ENCOUNTER (OUTPATIENT)
Dept: PHYSICAL THERAPY | Age: 72
Discharge: HOME | End: 2017-11-01
Attending: FAMILY MEDICINE
Payer: COMMERCIAL

## 2017-11-01 PROCEDURE — 97110 THERAPEUTIC EXERCISES: CPT

## 2017-11-01 NOTE — PROGRESS NOTES
Aba Reid  : 1945   Payor: Arthur Guerrero / Plan: [facility] / Product Type: PPO /    [address]  at [facility]  [address], [address], [place] [address]  [phone]   [phone]         OUTPATIENT PHYSICAL THERAPY:Daily Note 2017    ICD-10: Treatment Diagnosis:Low back pain (M54.5); Muscle weakness (generalized) (M62.81); Unsteadiness on feet (R26.81)  Precautions/Allergies:   Review of patient's allergies indicates no known allergies. Fall Risk Score: 0 (? 5 = High Risk)  MD Orders: PT eval and tx MEDICAL/REFERRING DIAGNOSIS:  low back pain   DATE OF ONSET: Late   REFERRING PHYSICIAN: Sandra Shah MD  RETURN PHYSICIAN APPOINTMENT: 6 months     ASSESSMENT:  Ms. Kingston Mena presents to PT eval w/ c/o LBP that has been bothering her since she was in her 's. She has a history of a discectomy in  and her most recent MRI shows degenerative changes w/ stenosis. She displayed decreased lumbar motion, decreased balance, and decreased B LE strength. She will benefit from continued skilled PT services to improve her deficits listed below and to progress towards her PLOF. PROBLEM LIST (Impacting functional limitations):  1. Decreased Strength  2. Decreased ADL/Functional Activities  3. Decreased Transfer Abilities  4. Decreased Balance  5. Increased Pain  6. Decreased Activity Tolerance  7. Decreased Flexibility/Joint Mobility  8. Decreased [place] with Home Exercise Program INTERVENTIONS PLANNED:  1. Balance Exercise  2. Cold  3. Electrical Stimulation  4. Heat  5. Home Exercise Program (HEP)  6. Manual Therapy  7. Range of Motion (ROM)  8. Therapeutic Activites  9. Therapeutic Exercise/Strengthening   TREATMENT PLAN:  Effective Dates: 17 TO 18.   Frequency/Duration: 1 time a week for 8 weeks  GOALS: (Goals have been discussed and agreed upon with patient.)  Discharge Goals: Time Frame: 8 weeks  Pt will be independent w/ HEP in order to improve outcomes and decrease pain. Pt will report pain of 3/10 or less while walking in grocery store in order to improve functional mobility. Pt will have Oswestry score of 23 or less in order to report decreased pain and decreased functional impairments. Pt will have B LE strength of 4/5 or greater in all major muscle groups in order to increase her safety w/ functional mobility. The information in this section was collected on 9/20/17 (except where otherwise noted). HISTORY:   History of Present Injury/Illness (Reason for Referral):  Pt has been having back pain since she was in her 19's. She had a discectomy in 1993 and her most recent MRI shows degenerative changes w/ stenosis. Past Medical History/Comorbidities:   Ms. Neal Toledo  has a past medical history of Chronic musculoskeletal pain; COPD; GERD (gastroesophageal reflux disease); GERD (gastroesophageal reflux disease); History of back surgery; History of colon polyps; History of left bundle branch block; History of tooth extraction; History of tubal ligation; Hypertension; Hypothyroidism; Menopause; OA (osteoarthritis); and Visit for dental examination (02/2017). She also has no past medical history of Difficult intubation; Malignant hyperthermia due to anesthesia; Nausea & vomiting; or Pseudocholinesterase deficiency. Ms. Neal Toledo  has a past surgical history that includes back surgery; heent; tubal ligation; colsc flx w/removal lesion by hot bx forceps (9/13/2013); colsc flx w/rmvl of tumor polyp lesion snare tq (12/19/2016); and colonoscopy (N/A, 12/19/2016). Social History/Living Environment:     Lives w/ spouse in 1 story home w/ tub/shower combo. She has 6 steps to enter her home.    Prior Level of Function/Work/Activity:  Works - clerical computer job full time   Dominant Side:         RIGHT    Current Medications:       Current Outpatient Prescriptions:     naproxen (NAPROSYN) 500 mg tablet, Take 1 Tab by mouth two (2) times daily (with meals). , Disp: 60 Tab, Rfl: 11    losartan (COZAAR) 100 mg tablet, Take 1 Tab by mouth daily for 90 days. , Disp: 90 Tab, Rfl: 3    levothyroxine (SYNTHROID) 150 mcg tablet, Take 1 Tab by mouth Daily (before breakfast). , Disp: 90 Tab, Rfl: 1    alendronate (FOSAMAX) 70 mg tablet, Take 1 Tab by mouth every seven (7) days. Indications: POST-MENOPAUSAL OSTEOPOROSIS, Disp: 12 Tab, Rfl: 3    atenolol (TENORMIN) 50 mg tablet, Take 2 Tabs by mouth daily for 90 days. , Disp: 180 Tab, Rfl: 3    BIOTIN PO, Take  by mouth., Disp: , Rfl:     omeprazole (PRILOSEC) 20 mg capsule, Take 20 mg by mouth every morning. Indications: GASTROESOPHAGEAL REFLUX, Disp: , Rfl:     multivitamin (ONE A DAY) tablet, Take 1 Tab by mouth nightly. Last 9/9/13, Disp: , Rfl:     aspirin 81 mg tablet, Take 81 mg by mouth every morning. Last 9/10/13, Disp: , Rfl:     calcium 500 mg Tab, Take  by mouth daily. , Disp: , Rfl:     ascorbic acid (VITAMIN C) 500 mg tablet, Take  by mouth daily. , Disp: , Rfl:    Date Last Reviewed:  11/1/2017   EXAMINATION:   Observation/Orthostatic Postural Assessment: Forward flexed in stand and sit, rounded shoulders  Palpation:          Tender in R hamstring and behind knee  Functional Mobility:         Gait/Ambulation: Forward flexed, slow, antalgic        Transfers:  Able to rise w/o UE assist         Bed Mobility:  Extra time and effort required   Balance:          Decreased on B LEs <10sec   Sensation:         Decreased on L medial malleolus, otherwise intact w/ light touch to B LEs     Joint/Muscle ROM Strength    Hip flexion WFL R 4-/5, L 4/5    Hip extension Decreased R 3/5, L 3+/5    Hip abduction WFL R 4-/5, L 4/5    Knee flexion WFL 5/5 R and L    Knee extension 35* R and L hamstring stretch test 5/5 R and L    DF WFL 5/5 R and L    Lumbar Decreased extension Decreased core strength          Body Structures Involved:  1. Nerves  2. Bones  3. Joints  4.  Muscles Body Functions Affected:  1. Sensory/Pain  2. Neuromusculoskeletal  3. Movement Related Activities and Participation Affected:  1. General Tasks and Demands  2. Mobility  3. Self Care  4. Domestic Life  5. Interpersonal Interactions and Relationships  6. Community, Social and Civic Life   CLINICAL PRESENTATION:   CLINICAL DECISION MAKING:   Outcome Measure: Tool Used: Modified Oswestry Low Back Pain Questionnaire  Score:  Initial: 28/50  Most Recent: X/50 (Date: -- )   Interpretation of Score: Each section is scored on a 0-5 scale, 5 representing the greatest disability. The scores of each section are added together for a total score of 50. Score 0 1-10 11-20 21-30 31-40 41-49 50   Modifier CH CI CJ CK CL CM CN     ? Mobility - Walking and Moving Around:     - CURRENT STATUS: CK - 40%-59% impaired, limited or restricted    - GOAL STATUS: CK - 40%-59% impaired, limited or restricted    - D/C STATUS:  ---------------To be determined---------------      Medical Necessity:   · Patient is expected to demonstrate progress in strength, range of motion and balance to increase independence with functional tasks. Reason for Services/Other Comments:  · Patient continues to demonstrate capacity to improve strength, ROM, balance which will increase independence. TREATMENT:   (In addition to Assessment/Re-Assessment sessions the following treatments were rendered)  Pre-treatment Symptoms/Complaints:  Pt reports she is feeling loose because she has been moving all morning. Pain: Initial:   0/10  Post Session:  0/10     THERAPEUTIC EXERCISE: (43 minutes):  Exercises per grid below to improve mobility, strength, balance w/ min verbal/tactile cues to progress towards her functional goals.      Date:  9/20/17 Date:  9/27/17 Date:  10/4/17 Date:  10/12/17 Date:  10/17/17 Date:  10/26/17 Date:  11/1/17   Activity/Exercise Parameters Parameters Parameters Parameters Parameters Parameters Parameters   HEP Issued          Repeated flexion in supine 10x2 (stronger)  20x w/ OP 2x20x w/ OP 3x10 w/ OP 3x10 w/ OP 3x10 w/ OP   Repeated flexion in sit 10x5 (strongest) 10x 10x       Repeated flexion in stand 10x1 (not the best)         Lower trunk rotation 10x each side 10x each side 10x each side 10x each side 10x each side 10x each side 10x each side   TA contraction 10x  2x10 2x10 2x10  2x10   Hamstring stretch 3x30 sec each side 3x30 sec on B LEs in sit 3x30 sec on B LEs in sit  3x30 sec on B LEs in sit 3x30 sec on B LEs, supine 3x30 sec on B LEs, supine    Clamshells  3x10, YTB 3x10, YTB 3x10, YTB 2x10, YTB 3x10 YTB 3x10, YTB   SLR   3x10, BLEs       Bridges    15x, YTB      Leg press  75#, 30x 75#, 40x 75#, 40x   75#, 30x   Mini squats  1x too much knee pain ------------------       NuStep   10 mins, 2.5 10 mins, 2.5 10 mins, 2.5 10 mins, 3.0 10 mins, 3.0   Cat camel          Prayer stretch   Attempted - unable       Dead bugs   10x    10x   [place] press walk outs       10x each side   Monster walks       YTB 20ftx2   Side steps       YTB 20ftx2   Seated ex ball leg lifts       15x    Balance       On foam sumit, tandem    goTenna Portal      Treatment/Session Assessment:    · Response to Treatment:  Pt progressing towards her goals. She reported feeling loose and pain free following exercises. ·  Compliance with Program/Exercises: Will assess as treatment progresses. · Recommendations/Intent for next treatment session: \"Next visit will focus on advancements to more challenging activities\".   Variance from POC:  none  PT Patient Time In/Time Out  Time In: 0735  Time Out: 0818    Lindsay Dumont, PT

## 2017-11-08 ENCOUNTER — HOSPITAL ENCOUNTER (OUTPATIENT)
Dept: MAMMOGRAPHY | Age: 72
Discharge: HOME | End: 2017-11-08
Attending: FAMILY MEDICINE
Payer: COMMERCIAL

## 2017-11-08 ENCOUNTER — HOSPITAL ENCOUNTER (OUTPATIENT)
Dept: PHYSICAL THERAPY | Age: 72
Discharge: HOME | End: 2017-11-08
Attending: FAMILY MEDICINE
Payer: COMMERCIAL

## 2017-11-08 DIAGNOSIS — Z12.31 VISIT FOR SCREENING MAMMOGRAM: ICD-10-CM

## 2017-11-08 PROCEDURE — 97110 THERAPEUTIC EXERCISES: CPT

## 2017-11-08 NOTE — THERAPY DISCHARGE
Sanjay Lei  : 1945   Payor: Liset Wagner / Plan: [facility] / Product Type: PPO /    [address]  at [facility]  [address], [address], [place]  [phone]   [phone]         OUTPATIENT PHYSICAL THERAPY:Daily Note and Discharge 2017    ICD-10: Treatment Diagnosis:Low back pain (M54.5); Muscle weakness (generalized) (M62.81); Unsteadiness on feet (R26.81)  Precautions/Allergies:   Review of patient's allergies indicates no known allergies. Fall Risk Score: 0 (? 5 = High Risk)  MD Orders: PT cory and tx MEDICAL/REFERRING DIAGNOSIS:  low back pain   DATE OF ONSET: Late   REFERRING PHYSICIAN: Zoe Gan MD  RETURN PHYSICIAN APPOINTMENT: 6 months     ASSESSMENT:  Ms. Yakov Linares presented to PT cory w/ c/o LBP that has been bothering her since she was in her 's. She has a history of a discectomy in  and her most recent MRI shows degenerative changes w/ stenosis. She has increased her balance and LE strength. Her pain levels have decreased, and her functional mobility has increased. She has met 4/5 Pt goals at this time. She is independent w/ her HEP. DC from outpatient PT at this time. TREATMENT PLAN:  Effective Dates: 17 TO 18. Frequency/Duration: 1 time a week for 8 weeks  GOALS: (Goals have been discussed and agreed upon with patient.)  Discharge Goals: Time Frame: 8 weeks  Pt will be independent w/ HEP in order to improve outcomes and decrease pain. MET  Pt will report pain of 3/10 or less while walking in grocery store in order to improve functional mobility. Not met - depends on the day sometimes less than 3 sometimes more  Pt will have Oswestry score of 23 or less in order to report decreased pain and decreased functional impairments. MET  Pt will have B LE strength of 4/5 or greater in all major muscle groups in order to increase her safety w/ functional mobility.  MET              The information in this section was collected on 9/20/17 (except where otherwise noted). HISTORY:   History of Present Injury/Illness (Reason for Referral):  Pt has been having back pain since she was in her 19's. She had a discectomy in 1993 and her most recent MRI shows degenerative changes w/ stenosis. Past Medical History/Comorbidities:   Ms. Claudette Snow  has a past medical history of Chronic musculoskeletal pain; COPD; GERD (gastroesophageal reflux disease); GERD (gastroesophageal reflux disease); History of back surgery; History of colon polyps; History of left bundle branch block; History of tooth extraction; History of tubal ligation; Hypertension; Hypothyroidism; Menopause; OA (osteoarthritis); and Visit for dental examination (02/2017). She also has no past medical history of Difficult intubation; Malignant hyperthermia due to anesthesia; Nausea & vomiting; or Pseudocholinesterase deficiency. Ms. Claudette Snow  has a past surgical history that includes back surgery; heent; tubal ligation; colsc flx w/removal lesion by hot bx forceps (9/13/2013); colsc flx w/rmvl of tumor polyp lesion snare tq (12/19/2016); and colonoscopy (N/A, 12/19/2016). Social History/Living Environment:     Lives w/ spouse in 1 story home w/ tub/shower combo. She has 6 steps to enter her home. Prior Level of Function/Work/Activity:  Works - clerical computer job full time   Dominant Side:         RIGHT    Current Medications:       Current Outpatient Prescriptions:     naproxen (NAPROSYN) 500 mg tablet, Take 1 Tab by mouth two (2) times daily (with meals). , Disp: 60 Tab, Rfl: 11    losartan (COZAAR) 100 mg tablet, Take 1 Tab by mouth daily for 90 days. , Disp: 90 Tab, Rfl: 3    levothyroxine (SYNTHROID) 150 mcg tablet, Take 1 Tab by mouth Daily (before breakfast). , Disp: 90 Tab, Rfl: 1    alendronate (FOSAMAX) 70 mg tablet, Take 1 Tab by mouth every seven (7) days.  Indications: POST-MENOPAUSAL OSTEOPOROSIS, Disp: 12 Tab, Rfl: 3    atenolol (TENORMIN) 50 mg tablet, Take 2 Tabs by mouth daily for 90 days. , Disp: 180 Tab, Rfl: 3    BIOTIN PO, Take  by mouth., Disp: , Rfl:     omeprazole (PRILOSEC) 20 mg capsule, Take 20 mg by mouth every morning. Indications: GASTROESOPHAGEAL REFLUX, Disp: , Rfl:     multivitamin (ONE A DAY) tablet, Take 1 Tab by mouth nightly. Last 9/9/13, Disp: , Rfl:     aspirin 81 mg tablet, Take 81 mg by mouth every morning. Last 9/10/13, Disp: , Rfl:     calcium 500 mg Tab, Take  by mouth daily. , Disp: , Rfl:     ascorbic acid (VITAMIN C) 500 mg tablet, Take  by mouth daily. , Disp: , Rfl:    Date Last Reviewed:  11/8/2017   EXAMINATION:   Observation/Orthostatic Postural Assessment: Forward flexed in stand and sit, rounded shoulders  Palpation:          Tender in R hamstring and behind knee  Functional Mobility:         Gait/Ambulation: Forward flexed, slow, antalgic        Transfers:  Able to rise w/o UE assist         Bed Mobility:  Extra time and effort required   Balance:          Decreased on B LEs <10sec   Sensation:         Decreased on L medial malleolus, otherwise intact w/ light touch to B LEs     Joint/Muscle ROM Strength   Hip flexion WFL R 4/5, L 4/5   Hip extension Decreased R 4/5, L 4/5   Hip abduction WFL R 4+/5, L 4/5   Knee flexion WFL 5/5 R and L   Knee extension 35* R and L hamstring stretch test 5/5 R and L   DF WFL 5/5 R and L   Lumbar Decreased extension Decreased core strength         Body Structures Involved:  1. Nerves  2. Bones  3. Joints  4. Muscles Body Functions Affected:  1. Sensory/Pain  2. Neuromusculoskeletal  3. Movement Related Activities and Participation Affected:  1. General Tasks and Demands  2. Mobility  3. Self Care  4. Domestic Life  5. Interpersonal Interactions and Relationships  6. Community, Social and Civic Life   CLINICAL PRESENTATION:   CLINICAL DECISION MAKING:   Outcome Measure:    Tool Used: Modified Oswestry Low Back Pain Questionnaire  Score:  Initial: 28/50  Most Recent: 21/50 (Date: 11/8/17)   Interpretation of Score: Each section is scored on a 0-5 scale, 5 representing the greatest disability. The scores of each section are added together for a total score of 50. Score 0 1-10 11-20 21-30 31-40 41-49 50   Modifier CH CI CJ CK CL CM CN     ? Mobility - Walking and Moving Around:     - CURRENT STATUS: CK - 40%-59% impaired, limited or restricted    - GOAL STATUS: CK - 40%-59% impaired, limited or restricted    - D/C STATUS:  CK - 40%-59% impaired, limited or restricted            TREATMENT:   (In addition to Assessment/Re-Assessment sessions the following treatments were rendered)  Pre-treatment Symptoms/Complaints:  Pt reports she is feeling good. She liked the exercises from last time and she even danced at a wedding this weekend. Pain: Initial:   4/10  Post Session:  2/10     THERAPEUTIC EXERCISE: (43 minutes):  Exercises per grid below to improve mobility, strength, balance w/ min verbal/tactile cues to progress towards her functional goals.      Date:  9/20/17 Date:  9/27/17 Date:  10/4/17 Date:  10/12/17 Date:  10/17/17 Date:  10/26/17 Date:  11/1/17 Date:  11/8/17   Activity/Exercise Parameters Parameters Parameters Parameters Parameters Parameters Parameters Parameters   HEP Issued           Repeated flexion in supine 10x2 (stronger)  20x w/ OP 2x20x w/ OP 3x10 w/ OP 3x10 w/ OP 3x10 w/ OP 15x   Repeated flexion in sit 10x5 (strongest) 10x 10x        Repeated flexion in stand 10x1 (not the best)          Lower trunk rotation 10x each side 10x each side 10x each side 10x each side 10x each side 10x each side 10x each side 10x each side   TA contraction 10x  2x10 2x10 2x10  2x10 2x10 5 sec hold   Hamstring stretch 3x30 sec each side 3x30 sec on B LEs in sit 3x30 sec on B LEs in sit  3x30 sec on B LEs in sit 3x30 sec on B LEs, supine 3x30 sec on B LEs, supine     Clamshells  3x10, YTB 3x10, YTB 3x10, YTB 2x10, YTB 3x10 YTB 3x10, YTB 2x10 YTB   SLR   3x10, BLEs Bridges    15x, YTB       Leg press  75#, 30x 75#, 40x 75#, 40x   75#, 30x 75# 20x; 15x 55# in healthy self gym   Mini squats  1x too much knee pain ------------------        NuStep   10 mins, 2.5 10 mins, 2.5 10 mins, 2.5 10 mins, 3.0 10 mins, 3.0 10 mins, 3.0   Cat camel           Prayer stretch   Attempted - unable        Dead bugs   10x    10x 10x each side   Pallof press walk outs       10x each side 10x each side   Monster walks       YTB 20ftx2    Side steps       YTB 20ftx2    Seated ex ball leg lifts       15x  15x    Balance       On foam sumit, tandem     [employer]  Access Code: [email or web address]   URL: [email or web address]   Date: 11/08/2017   Prepared by: Mahendra Mac     Exercises   Supine Lower Trunk Rotation - 10 reps - 1 sets - 5 hold - 1x daily - 7x weekly   Supine Transversus Abdominis Bracing - Hands on Stomach - 10 reps - 3 sets - 5 hold - 1x daily - 5x weekly   Seated Hamstring Stretch - 3 reps - 1 sets - 30 hold - 1x daily - 7x weekly   Clamshell with Resistance - 10 reps - 3 sets - 0 hold - 1x daily - 3x weekly   Supine Double Knee to Chest - 10 reps - 3 sets - 5 hold - 1x daily - 7x weekly   Forward Monster Walks - 10 reps - 3 sets - 0 hold - 1x daily - 3x weekly   Side Stepping with Resistance at Thighs and Ankles - 10 reps - 3 sets - 0 hold - 1x daily - 3x weekly   Supine Dead Bug with Leg Extension - 10 reps - 2 sets - 0 hold - 1x daily - 3x weekly   Standing Anti-Rotation Press with Anchored Resistance - 10 reps - 3 sets - 5 hold - 1x daily - 3x weekly   Single Leg Stance with Support - 10 reps - 3 sets - 10 hold - 1x daily - 3x weekly       Treatment/Session Assessment:    · Response to Treatment:  Pt is independent w/ her HEP and has met 3/4 PT goals.  DC from outpatient PT.   ·  Compliance with Program/Exercises: Independent  Variance from POC:  none  PT Patient Time In/Time Out  Time In: 0730  Time Out: 0820    Mahendra Mac, PT

## 2017-11-16 ENCOUNTER — HOSPITAL ENCOUNTER (OUTPATIENT)
Dept: MAMMOGRAPHY | Age: 72
Discharge: HOME | End: 2017-11-16
Attending: FAMILY MEDICINE
Payer: COMMERCIAL

## 2017-11-16 DIAGNOSIS — R92.8 ABNORMAL SCREENING MAMMOGRAM: ICD-10-CM

## 2017-11-16 PROCEDURE — 77065 DX MAMMO INCL CAD UNI: CPT

## 2017-11-21 ENCOUNTER — HOSPITAL ENCOUNTER (OUTPATIENT)
Dept: MAMMOGRAPHY | Age: 72
Discharge: HOME | End: 2017-11-21
Attending: FAMILY MEDICINE
Payer: COMMERCIAL

## 2017-11-21 VITALS
OXYGEN SATURATION: 95 % | HEART RATE: 63 BPM | DIASTOLIC BLOOD PRESSURE: 86 MMHG | TEMPERATURE: 98.7 F | SYSTOLIC BLOOD PRESSURE: 190 MMHG

## 2017-11-21 DIAGNOSIS — R92.8 ABNORMALITY OF RIGHT BREAST ON SCREENING MAMMOGRAM: ICD-10-CM

## 2017-11-21 PROCEDURE — 77065 DX MAMMO INCL CAD UNI: CPT

## 2017-11-21 PROCEDURE — 88305 TISSUE EXAM BY PATHOLOGIST: CPT | Performed by: FAMILY MEDICINE

## 2017-11-21 PROCEDURE — 19081 BX BREAST 1ST LESION STRTCTC: CPT

## 2017-11-21 PROCEDURE — 74011250636 HC RX REV CODE- 250/636: Performed by: FAMILY MEDICINE

## 2017-11-21 PROCEDURE — 74011000250 HC RX REV CODE- 250: Performed by: FAMILY MEDICINE

## 2017-11-21 RX ORDER — LIDOCAINE HYDROCHLORIDE 10 MG/ML
10 INJECTION INFILTRATION; PERINEURAL
Status: COMPLETED | OUTPATIENT
Start: 2017-11-21 | End: 2017-11-21

## 2017-11-21 RX ADMIN — SODIUM CHLORIDE 250 ML: 900 INJECTION, SOLUTION INTRAVENOUS at 10:32

## 2017-11-21 RX ADMIN — LIDOCAINE HYDROCHLORIDE 6 ML: 10 INJECTION, SOLUTION INFILTRATION; PERINEURAL at 10:33

## 2018-05-10 PROBLEM — E66.01 SEVERE OBESITY (BMI 35.0-39.9) WITH COMORBIDITY (HCC): Status: ACTIVE | Noted: 2018-05-10

## 2018-12-18 ENCOUNTER — HOSPITAL ENCOUNTER (OUTPATIENT)
Dept: MAMMOGRAPHY | Age: 73
Discharge: HOME | End: 2018-12-18
Attending: FAMILY MEDICINE
Payer: COMMERCIAL

## 2018-12-18 DIAGNOSIS — Z12.31 VISIT FOR SCREENING MAMMOGRAM: ICD-10-CM

## 2018-12-18 PROCEDURE — 77067 SCR MAMMO BI INCL CAD: CPT

## 2019-12-03 ENCOUNTER — HOSPITAL ENCOUNTER (OUTPATIENT)
Dept: SURGERY | Age: 74
Discharge: HOME | DRG: 470 | End: 2019-12-03
Payer: COMMERCIAL

## 2019-12-03 ENCOUNTER — HOME HEALTH ADMISSION (OUTPATIENT)
Dept: HOME HEALTH SERVICES | Facility: HOME HEALTH | Age: 74
End: 2019-12-03
Payer: COMMERCIAL

## 2019-12-03 ENCOUNTER — HOSPITAL ENCOUNTER (OUTPATIENT)
Dept: PHYSICAL THERAPY | Age: 74
Discharge: HOME | End: 2019-12-03
Payer: COMMERCIAL

## 2019-12-03 VITALS
TEMPERATURE: 96.9 F | DIASTOLIC BLOOD PRESSURE: 84 MMHG | WEIGHT: 208 LBS | HEART RATE: 64 BPM | HEIGHT: 64 IN | OXYGEN SATURATION: 97 % | BODY MASS INDEX: 35.51 KG/M2 | SYSTOLIC BLOOD PRESSURE: 176 MMHG | RESPIRATION RATE: 16 BRPM

## 2019-12-03 LAB
ANION GAP SERPL CALC-SCNC: 8 MMOL/L (ref 7–16)
APTT PPP: 31.1 SEC (ref 24.7–39.8)
ATRIAL RATE: 66 BPM
BACTERIA SPEC CULT: NORMAL
BASOPHILS # BLD: 0.1 K/UL (ref 0–0.2)
BASOPHILS NFR BLD: 1 % (ref 0–2)
BUN SERPL-MCNC: 13 MG/DL (ref 8–23)
CALCIUM SERPL-MCNC: 9.5 MG/DL (ref 8.3–10.4)
CALCULATED P AXIS, ECG09: 37 DEGREES
CALCULATED R AXIS, ECG10: 40 DEGREES
CALCULATED T AXIS, ECG11: 67 DEGREES
CHLORIDE SERPL-SCNC: 99 MMOL/L (ref 98–107)
CO2 SERPL-SCNC: 27 MMOL/L (ref 21–32)
CREAT SERPL-MCNC: 0.74 MG/DL (ref 0.6–1)
DIAGNOSIS, 93000: NORMAL
DIFFERENTIAL METHOD BLD: NORMAL
EOSINOPHIL # BLD: 0.3 K/UL (ref 0–0.8)
EOSINOPHIL NFR BLD: 3 % (ref 0.5–7.8)
ERYTHROCYTE [DISTWIDTH] IN BLOOD BY AUTOMATED COUNT: 11.9 % (ref 11.9–14.6)
EST. AVERAGE GLUCOSE BLD GHB EST-MCNC: 123 MG/DL
GLUCOSE SERPL-MCNC: 105 MG/DL (ref 65–100)
HBA1C MFR BLD: 5.9 %
HCT VFR BLD AUTO: 45.4 % (ref 35.8–46.3)
HGB BLD-MCNC: 15.4 G/DL (ref 11.7–15.4)
IMM GRANULOCYTES # BLD AUTO: 0.1 K/UL (ref 0–0.5)
IMM GRANULOCYTES NFR BLD AUTO: 1 % (ref 0–5)
INR PPP: 0.9
LYMPHOCYTES # BLD: 3 K/UL (ref 0.5–4.6)
LYMPHOCYTES NFR BLD: 30 % (ref 13–44)
MCH RBC QN AUTO: 30.8 PG (ref 26.1–32.9)
MCHC RBC AUTO-ENTMCNC: 33.9 G/DL (ref 31.4–35)
MCV RBC AUTO: 90.8 FL (ref 79.6–97.8)
MONOCYTES # BLD: 0.8 K/UL (ref 0.1–1.3)
MONOCYTES NFR BLD: 8 % (ref 4–12)
NEUTS SEG # BLD: 5.6 K/UL (ref 1.7–8.2)
NEUTS SEG NFR BLD: 57 % (ref 43–78)
NRBC # BLD: 0 K/UL (ref 0–0.2)
P-R INTERVAL, ECG05: 162 MS
PLATELET # BLD AUTO: 364 K/UL (ref 150–450)
PMV BLD AUTO: 9.4 FL (ref 9.4–12.3)
POTASSIUM SERPL-SCNC: 4.3 MMOL/L (ref 3.5–5.1)
PROTHROMBIN TIME: 12.8 SEC (ref 11.7–14.5)
Q-T INTERVAL, ECG07: 438 MS
QRS DURATION, ECG06: 138 MS
QTC CALCULATION (BEZET), ECG08: 459 MS
RBC # BLD AUTO: 5 M/UL (ref 4.05–5.2)
SERVICE CMNT-IMP: NORMAL
SODIUM SERPL-SCNC: 134 MMOL/L (ref 136–145)
VENTRICULAR RATE, ECG03: 66 BPM
WBC # BLD AUTO: 9.9 K/UL (ref 4.3–11.1)

## 2019-12-03 PROCEDURE — 87641 MR-STAPH DNA AMP PROBE: CPT

## 2019-12-03 PROCEDURE — 85025 COMPLETE CBC W/AUTO DIFF WBC: CPT

## 2019-12-03 PROCEDURE — 93005 ELECTROCARDIOGRAM TRACING: CPT | Performed by: PHYSICIAN ASSISTANT

## 2019-12-03 PROCEDURE — 80048 BASIC METABOLIC PNL TOTAL CA: CPT

## 2019-12-03 PROCEDURE — 83036 HEMOGLOBIN GLYCOSYLATED A1C: CPT

## 2019-12-03 PROCEDURE — 77030027138 HC INCENT SPIROMETER -A

## 2019-12-03 PROCEDURE — 85610 PROTHROMBIN TIME: CPT

## 2019-12-03 PROCEDURE — 36415 COLL VENOUS BLD VENIPUNCTURE: CPT

## 2019-12-03 PROCEDURE — 97161 PT EVAL LOW COMPLEX 20 MIN: CPT

## 2019-12-03 PROCEDURE — 85730 THROMBOPLASTIN TIME PARTIAL: CPT

## 2019-12-03 NOTE — PROGRESS NOTES
SW met with pt in Prehab to discuss Left CALVIN scheduled for 12/5/19. Pt plans to return home with spouse and HHPT. Pt resides in [facility] and is agreeable to [facility]. [facility] referral completed. Pt has a rollator, cane and higher toilets. No additional DME anticipated. No additional needs or questions identified at this time. Pam Austrian  The Plan for Transition of Care is related to the following treatment goals: Ambulation and safety awareness for postop. The Patient  was provided with a choice of provider and agrees   with the discharge plan. [x] Yes [] No    Freedom of choice list was provided with basic dialogue that supports the patient's individualized plan of care/goals, treatment preferences and shares the quality data associated with the providers.  [x] Yes [] No

## 2019-12-03 NOTE — PERIOP NOTES
PLEASE CONTINUE TAKING ALL PRESCRIPTION MEDICATIONS UP TO THE DAY OF SURGERY UNLESS OTHERWISE DIRECTED BELOW. DISCONTINUE all vitamins and supplements 21 days prior to surgery. Home Medications to take  the day of surgery    Levothyroxine   Metoprolol   Omeprazole     Home Medications   to Hold   Vitamins, Supplements, and Herbals. Non-Steriodal Anti-Inflammatory (NSAIDS) such as Advil and Aleve. Aspirin hold from today until after surgery        Comments   Please bring non-formulary Omeprazole (in original bottle) to hospital on [employer]          Please do not bring home medications with you on the day of surgery unless otherwise directed by your nurse. If you are instructed to bring home medications, please give them to your nurse as they will be administered by the nursing staff. If you have any questions, please call [facility] [phone] or [facility] [phone].

## 2019-12-03 NOTE — PERIOP NOTES
How to Use Your Incentive Spirometer       About Your Incentive Spirometer  An incentive spirometer is a device that will expand your lungs by helping you to breathe more deeply and fully. The parts of your incentive spirometer are labeled in Figure 1. Using your incentive spirometer  When youre using your incentive spirometer, make sure to breathe through your mouth. If you breathe through your nose, the incentive spirometer wont work properly. You can hold your nose if you have trouble. DO NOT BLOW INTO THE DEVICE. If you feel dizzy at any time, stop and rest. Try again at a later time. 1. Sit upright in a chair or in bed. Hold the incentive spirometer at eye level. 2. Put the mouthpiece in your mouth and close your lips tightly around it. Slowly breathe out (exhale) completely. 3. Breathe in (inhale) slowly through your mouth as deeply as you can. As you take the breath, you will see the piston rise inside the large column. While the piston rises, the indicator on the right should move upwards. It should stay in between the 2 arrows (see Figure 1). 4. Try to get the piston as high as you can, while keeping the indicator between the arrows. If the indicator doesnt stay between the arrows, youre breathing either too fast or too slow. 5. When you get it as high as you can, hold your breath for 10 seconds, or as long as possible. While youre holding your breath, the piston will slowly fall to the base of the spirometer. 6. Once the piston reaches the bottom of the spirometer, breathe out slowly through your mouth. Rest for a few seconds. 7. Repeat 10 times. Try to get the piston to the same level with each breath. 8. After each set of 10 breaths, try to cough as coughing will help loosen or clear any mucus in your lungs. 9. Put the marker at the level the piston reached on your incentive spirometer. This will be your goal next time. Repeat these steps every hour that youre awake.   Cover the mouthpiece of the incentive spirometer when you arent using it

## 2019-12-03 NOTE — PROGRESS NOTES
Irine Aase  : [birth year](24 y.o.) [facility] at [address], [place] [address].  [phone]       Physical Therapy Prehab Plan of Treatment and Evaluation Summary:12/3/2019    ICD-10: Treatment Diagnosis:   · Pain in left hip (M25.552)  · Stiffness of Left Hip, Not elsewhere classified (Q45.650)  Precautions/Allergies:   Patient has no known allergies. MEDICAL/REFERRING DIAGNOSIS:  Unilateral primary osteoarthritis, left hip [M16.12]  REFERRING PHYSICIAN: Yanna Almeida,*  DATE OF SURGERY: 19    Assessment:   Comments:  She is here with her spouse who is supportive. She plans on going home at [place] with his support. She is currently using a str cane for amb. PROBLEM LIST (Impacting functional limitations):  Ms. Dina Armenta presents with the following left lower extremity(s) problems:  1. Strength  2. Range of Motion  3. Home Exercise Program  4. Pain   INTERVENTIONS PLANNED:  1. Home Exercise Program  2. Educational Discussion      TREATMENT PLAN: Effective Dates: 12/3/2019 TO 12/3/2019. Frequency/Duration: Patient to continue to perform home exercise program at least twice per day up until her surgery. GOALS: (Goals have been discussed and agreed upon with patient.)  Discharge Goals: Time Frame: 1 Day  1. Patient will demonstrate independence with a home exercise program designed to increase strength, range of motion and pain control to minimize functional deficits and optimize patient for total joint replacement. Rehabilitation Potential For Stated Goals: Good  Regarding Judah Winn's therapy, I certify that the treatment plan above will be carried out by a therapist or under their direction.   Thank you for this referral,  Lucille Zamorano, PT               HISTORY:   Present Symptoms:  Pain Intensity 1: 8(at its worst)  Pain Location 1: Hip  Pain Orientation 1: Anterior, Lateral, Medial, Posterior, Left   History of Present Injury/Illness (Reason for Referral):  Medical/Referring Diagnosis: Unilateral primary osteoarthritis, left hip [M16.12]   Past Medical History/Comorbidities:   Ms. Patito Valencia  has a past medical history of Chronic musculoskeletal pain, COPD, GERD (gastroesophageal reflux disease), GERD (gastroesophageal reflux disease), History of back surgery, History of colon polyps, History of left bundle branch block, History of tooth extraction, History of tubal ligation, Hypertension, Hypothyroidism, Menopause, OA (osteoarthritis), and Visit for dental examination (02/2017). Ms. Patito Valencia  has a past surgical history that includes hx back surgery; hx heent; hx tubal ligation; pr colsc flx w/removal lesion by hot bx forceps (9/13/2013); pr colsc flx w/rmvl of tumor polyp lesion snare tq (12/19/2016); colonoscopy (N/A, 12/19/2016); and hx breast biopsy (Right, 11/21/2017). Social History/Living Environment:   Home Environment: Private residence  # Steps to Enter: 4  Rails to Enter: Yes  Hand Rails : Right  One/Two Story Residence: One story  Living Alone: No  Support Systems: Spouse/Significant Other/Partner  Patient Expects to be Discharged to[de-identified] Private residence  Current DME Used/Available at Home: [place], [address] [facility], [place], straight, Shower chair  Tub or Shower Type: Tub/Shower combination  Work/Activity:  Works at [employer].  Mainly sitting  Dominant Side:  RIGHT  Current Medications:  See Pre-assessment nursing note   Number of Personal Factors/Comorbidities that affect the Plan of Care: 3+: HIGH COMPLEXITY   EXAMINATION:   ADLs (Current Functional Status):   Ambulation:  [x] Independent  [] Walk Indoors Only  [] Walk Outdoors  [x] Use Assistive Device  [] Use Wheelchair Only Dressing:  [] Independent  Requires Assistance from Someone for:  [x] Sock/Shoes  [] Pants  [] Everything   Bathing/Showering:   [x] Independent  [] Requires Assistance from Someone  [] Sponge Bath Only Household Activities:  [] Routine house and yard work  [x] Light Housework Only  [] None   Observation/Orthostatic Postural Assessment:    Genu valgus left, Genu valgus right  ROM/Flexibility:   AROM: Generally decreased, functional(R LE)                LLE AROM  L Hip Flexion: 100  L Hip ABduction: 15          Strength:   Strength: Generally decreased, functional(R LE 4/5)       LLE Strength  L Hip Flexion: 3+  L Hip ABduction: 3+  L Knee Extension: 3+  L Ankle Dorsiflexion: 4          Functional Mobility:    Sensation: Intact(R LE)    Stand to Sit: Independent  Sit to Stand: Independent  Stand Pivot Transfers: Independent  Distance (ft): 1000 Feet (ft)  Ambulation - Level of Assistance: Modified independent  Assistive Device: Cane, straight  Base of Support: Narrowed  Speed/Pati: Pace decreased (<100 feet/min)  Step Length: Right shortened  Stance: Left decreased  Gait Abnormalities: Antalgic          Balance:    Sitting: Intact  Standing: With support   Body Structures Involved:  1. Bones  2. Joints  3. Muscles Body Functions Affected:  1. Neuromusculoskeletal  2. Movement Related Activities and Participation Affected:  1. General Tasks and Demands  2. Mobility   Number of elements that affect the Plan of Care: 4+: HIGH COMPLEXITY   CLINICAL PRESENTATION:   Presentation: Stable and uncomplicated: LOW COMPLEXITY   CLINICAL DECISION MAKING:   Outcome Measure: Tool Used: Lower Extremity Functional Scale (LEFS)  Score:  Initial: 27/80 Most Recent: X/80 (Date: -- )   Interpretation of Score: 20 questions each scored on a 5 point scale with 0 representing \"extreme difficulty or unable to perform\" and 4 representing \"no difficulty\". The lower the score, the greater the functional disability. 80/80 represents no disability. Minimal detectable change is 9 points. Medical Necessity:   · Ms. Winn is expected to optimize her lower extremity strength and ROM in preparation for joint replacement surgery.   Reason for Services/Other Comments:  · Achieve baseline assesment of musculoskeletal system, functional mobility and home environment. , educate in PT HEP in preparation for surgery, educate in hospital plan of care. Use of outcome tool(s) and clinical judgement create a POC that gives a: Clear prediction of patient's progress: LOW COMPLEXITY   TREATMENT:   Treatment/Session Assessment:  Patient was instructed in PT- HEP to increase strength and ROM in LEs. Answered all questions. · Post session pain:  2  · Compliance with Program/Exercises: compliant most of the time.   Total Treatment Duration:  PT Patient Time In/Time Out  Time In: 0915  Time Out: 1100 Seth Jaramillo, PT

## 2019-12-03 NOTE — PERIOP NOTES
Recent Results (from the past 8 hour(s))   CBC WITH AUTOMATED DIFF    Collection Time: 12/03/19  9:20 AM   Result Value Ref Range    WBC 9.9 4.3 - 11.1 K/uL    RBC 5.00 4.05 - 5.2 M/uL    HGB 15.4 11.7 - 15.4 g/dL    HCT 45.4 35.8 - 46.3 %    MCV 90.8 79.6 - 97.8 FL    MCH 30.8 26.1 - 32.9 PG    MCHC 33.9 31.4 - 35.0 g/dL    RDW 11.9 11.9 - 14.6 %    PLATELET 275 713 - 415 K/uL    MPV 9.4 9.4 - 12.3 FL    ABSOLUTE NRBC 0.00 0.0 - 0.2 K/uL    DF AUTOMATED      NEUTROPHILS 57 43 - 78 %    LYMPHOCYTES 30 13 - 44 %    MONOCYTES 8 4.0 - 12.0 %    EOSINOPHILS 3 0.5 - 7.8 %    BASOPHILS 1 0.0 - 2.0 %    IMMATURE GRANULOCYTES 1 0.0 - 5.0 %    ABS. NEUTROPHILS 5.6 1.7 - 8.2 K/UL    ABS. LYMPHOCYTES 3.0 0.5 - 4.6 K/UL    ABS. MONOCYTES 0.8 0.1 - 1.3 K/UL    ABS. EOSINOPHILS 0.3 0.0 - 0.8 K/UL    ABS. BASOPHILS 0.1 0.0 - 0.2 K/UL    ABS. IMM.  GRANS. 0.1 0.0 - 0.5 K/UL   HEMOGLOBIN A1C WITH EAG    Collection Time: 12/03/19  9:20 AM   Result Value Ref Range    Hemoglobin A1c 5.9 %    Est. average glucose 839 mg/dL   METABOLIC PANEL, BASIC    Collection Time: 12/03/19  9:20 AM   Result Value Ref Range    Sodium 134 (L) 136 - 145 mmol/L    Potassium 4.3 3.5 - 5.1 mmol/L    Chloride 99 98 - 107 mmol/L    CO2 27 21 - 32 mmol/L    Anion gap 8 7 - 16 mmol/L    Glucose 105 (H) 65 - 100 mg/dL    BUN 13 8 - 23 MG/DL    Creatinine 0.74 0.6 - 1.0 MG/DL    GFR est AA >60 >60 ml/min/1.73m2    GFR est non-AA >60 >60 ml/min/1.73m2    Calcium 9.5 8.3 - 10.4 MG/DL   PROTHROMBIN TIME + INR    Collection Time: 12/03/19  9:20 AM   Result Value Ref Range    Prothrombin time 12.8 11.7 - 14.5 sec    INR 0.9     PTT    Collection Time: 12/03/19  9:20 AM   Result Value Ref Range    aPTT 31.1 24.7 - 39.8 SEC   EKG, 12 LEAD, INITIAL    Collection Time: 12/03/19 10:24 AM   Result Value Ref Range    Ventricular Rate 66 BPM    Atrial Rate 66 BPM    P-R Interval 162 ms    QRS Duration 138 ms    Q-T Interval 438 ms    QTC Calculation (Bezet) 459 ms Calculated P Axis 37 degrees    Calculated R Axis 40 degrees    Calculated T Axis 67 degrees    Diagnosis       Normal sinus rhythm  Left bundle branch block  Abnormal ECG  No previous ECGs available

## 2019-12-03 NOTE — PERIOP NOTES
Patient verified name and . Order for consent yes found in EHR and matches case posting; patient verified. Type 3 surgery, joint camp assessment complete. Labs per surgeon: cbc, bmp, pt, ptt ; results within anesthesia guidelines; placed on chart for reference;routed to surgeon, Dr Madhu Chaves and to pcp, Dr Samantha Kwong for review. Labs per anesthesia protocol: none  EKG:completed per protocol and within anesthesia guidelines; placed on chart for reference. MRSA/MSSA swab collected; pharmacy to review and dose antibiotic as appropriate. Hospital approved surgical skin cleanser and instructions to return bottle on DOS given per hospital policy. Patient provided with handouts including Guide to Surgery, Pain Management, Hand Hygiene, Blood Transfusion Education, and [facility] Brochure. Patient answered medical/surgical history questions at their best of ability. All prior to admission medications documented in [facility]. Original medication prescription bottle NOT visualized during patient appointment. Patient instructed to hold all vitamins 7 days prior to surgery and NSAIDS 5 days prior to surgery. Prescription medications to hold: none    Patient instructed to continue previous medications as prescribed prior to surgery and to take the following medications the day of surgery according to anesthesia guidelines with a small sip of water: Metoprolol, Omeprazole, Levothyroxine. Patient teach back successful and patient demonstrates knowledge of instruction.

## 2019-12-04 ENCOUNTER — ANESTHESIA EVENT (OUTPATIENT)
Dept: SURGERY | Age: 74
DRG: 470 | End: 2019-12-04
Payer: COMMERCIAL

## 2019-12-05 ENCOUNTER — APPOINTMENT (OUTPATIENT)
Dept: GENERAL RADIOLOGY | Age: 74
DRG: 470 | End: 2019-12-05
Attending: PHYSICIAN ASSISTANT
Payer: COMMERCIAL

## 2019-12-05 ENCOUNTER — ANESTHESIA (OUTPATIENT)
Dept: SURGERY | Age: 74
DRG: 470 | End: 2019-12-05
Payer: COMMERCIAL

## 2019-12-05 ENCOUNTER — HOSPITAL ENCOUNTER (INPATIENT)
Age: 74
LOS: 1 days | Discharge: HOME HEALTH | DRG: 470 | End: 2019-12-06
Attending: ORTHOPAEDIC SURGERY | Admitting: ORTHOPAEDIC SURGERY
Payer: COMMERCIAL

## 2019-12-05 DIAGNOSIS — M16.12 ARTHRITIS OF LEFT HIP: Primary | ICD-10-CM

## 2019-12-05 LAB
GLUCOSE BLD STRIP.AUTO-MCNC: 122 MG/DL (ref 65–100)
HGB BLD-MCNC: 13.8 G/DL (ref 11.7–15.4)

## 2019-12-05 PROCEDURE — 77030018836 HC SOL IRR NACL ICUM -A: Performed by: ORTHOPAEDIC SURGERY

## 2019-12-05 PROCEDURE — 74011250636 HC RX REV CODE- 250/636: Performed by: PHYSICIAN ASSISTANT

## 2019-12-05 PROCEDURE — 77030002966 HC SUT PDS J&J -A: Performed by: ORTHOPAEDIC SURGERY

## 2019-12-05 PROCEDURE — 77030007880 HC KT SPN EPDRL BBMI -B: Performed by: NURSE ANESTHETIST, CERTIFIED REGISTERED

## 2019-12-05 PROCEDURE — 74011000250 HC RX REV CODE- 250: Performed by: ORTHOPAEDIC SURGERY

## 2019-12-05 PROCEDURE — 74011000258 HC RX REV CODE- 258: Performed by: ORTHOPAEDIC SURGERY

## 2019-12-05 PROCEDURE — C1776 JOINT DEVICE (IMPLANTABLE): HCPCS | Performed by: ORTHOPAEDIC SURGERY

## 2019-12-05 PROCEDURE — 97165 OT EVAL LOW COMPLEX 30 MIN: CPT

## 2019-12-05 PROCEDURE — 97535 SELF CARE MNGMENT TRAINING: CPT

## 2019-12-05 PROCEDURE — 94760 N-INVAS EAR/PLS OXIMETRY 1: CPT

## 2019-12-05 PROCEDURE — 85018 HEMOGLOBIN: CPT

## 2019-12-05 PROCEDURE — 77030006835 HC BLD SAW SAG STRY -B: Performed by: ORTHOPAEDIC SURGERY

## 2019-12-05 PROCEDURE — 0SRB0JA REPLACEMENT OF LEFT HIP JOINT WITH SYNTHETIC SUBSTITUTE, UNCEMENTED, OPEN APPROACH: ICD-10-PCS | Performed by: ORTHOPAEDIC SURGERY

## 2019-12-05 PROCEDURE — 74011000250 HC RX REV CODE- 250: Performed by: NURSE ANESTHETIST, CERTIFIED REGISTERED

## 2019-12-05 PROCEDURE — 74011000250 HC RX REV CODE- 250: Performed by: ANESTHESIOLOGY

## 2019-12-05 PROCEDURE — 77030013708 HC HNDPC SUC IRR PULS STRY –B: Performed by: ORTHOPAEDIC SURGERY

## 2019-12-05 PROCEDURE — 74011250637 HC RX REV CODE- 250/637: Performed by: PHYSICIAN ASSISTANT

## 2019-12-05 PROCEDURE — 97161 PT EVAL LOW COMPLEX 20 MIN: CPT

## 2019-12-05 PROCEDURE — 76060000034 HC ANESTHESIA 1.5 TO 2 HR: Performed by: ORTHOPAEDIC SURGERY

## 2019-12-05 PROCEDURE — 36415 COLL VENOUS BLD VENIPUNCTURE: CPT

## 2019-12-05 PROCEDURE — 77030040922 HC BLNKT HYPOTHRM STRY -A: Performed by: NURSE ANESTHETIST, CERTIFIED REGISTERED

## 2019-12-05 PROCEDURE — 97110 THERAPEUTIC EXERCISES: CPT

## 2019-12-05 PROCEDURE — 77030002933 HC SUT MCRYL J&J -A: Performed by: ORTHOPAEDIC SURGERY

## 2019-12-05 PROCEDURE — 77030040361 HC SLV COMPR DVT MDII -B

## 2019-12-05 PROCEDURE — 74011250636 HC RX REV CODE- 250/636: Performed by: ORTHOPAEDIC SURGERY

## 2019-12-05 PROCEDURE — 74011250636 HC RX REV CODE- 250/636: Performed by: ANESTHESIOLOGY

## 2019-12-05 PROCEDURE — 76210000006 HC OR PH I REC 0.5 TO 1 HR: Performed by: ORTHOPAEDIC SURGERY

## 2019-12-05 PROCEDURE — 72170 X-RAY EXAM OF PELVIS: CPT

## 2019-12-05 PROCEDURE — 74011250636 HC RX REV CODE- 250/636: Performed by: NURSE ANESTHETIST, CERTIFIED REGISTERED

## 2019-12-05 PROCEDURE — 76010000162 HC OR TIME 1.5 TO 2 HR INTENSV-TIER 1: Performed by: ORTHOPAEDIC SURGERY

## 2019-12-05 PROCEDURE — 82962 GLUCOSE BLOOD TEST: CPT

## 2019-12-05 PROCEDURE — 77030002913 HC SUT ETHBND J&J -B: Performed by: ORTHOPAEDIC SURGERY

## 2019-12-05 PROCEDURE — 77030003665 HC NDL SPN BBMI -A: Performed by: ANESTHESIOLOGY

## 2019-12-05 PROCEDURE — 65270000029 HC RM PRIVATE

## 2019-12-05 DEVICE — PINNACLE GRIPTION ACETABULAR SHELL MULTI-HOLE 54MM OD
Type: IMPLANTABLE DEVICE | Site: HIP | Status: FUNCTIONAL
Brand: PINNACLE GRIPTION

## 2019-12-05 DEVICE — BIOLOX DELTA CERAMIC FEMORAL HEAD +5.0 36MM DIA 12/14 TAPER
Type: IMPLANTABLE DEVICE | Site: HIP | Status: FUNCTIONAL
Brand: BIOLOX DELTA

## 2019-12-05 DEVICE — PINNACLE HIP SOLUTIONS ALTRX POLYETHYLENE ACETABULAR LINER NEUTRAL 36MM ID 54MM OD
Type: IMPLANTABLE DEVICE | Site: HIP | Status: FUNCTIONAL
Brand: PINNACLE ALTRX

## 2019-12-05 DEVICE — PINNACLE CANCELLOUS BONE SCREW 6.5MM X 25MM
Type: IMPLANTABLE DEVICE | Site: HIP | Status: FUNCTIONAL
Brand: PINNACLE

## 2019-12-05 DEVICE — ACTIS DUOFIX HIP PROSTHESIS (FEMORAL STEM 12/14 TAPER CEMENTLESS SIZE 8 STD COLLAR)  CE
Type: IMPLANTABLE DEVICE | Site: HIP | Status: FUNCTIONAL
Brand: ACTIS

## 2019-12-05 RX ORDER — PANTOPRAZOLE SODIUM 40 MG/1
40 TABLET, DELAYED RELEASE ORAL
Status: DISCONTINUED | OUTPATIENT
Start: 2019-12-06 | End: 2019-12-06 | Stop reason: HOSPADM

## 2019-12-05 RX ORDER — OXYCODONE HYDROCHLORIDE 5 MG/1
5 TABLET ORAL
Status: DISCONTINUED | OUTPATIENT
Start: 2019-12-05 | End: 2019-12-05 | Stop reason: HOSPADM

## 2019-12-05 RX ORDER — SODIUM CHLORIDE, SODIUM LACTATE, POTASSIUM CHLORIDE, CALCIUM CHLORIDE 600; 310; 30; 20 MG/100ML; MG/100ML; MG/100ML; MG/100ML
100 INJECTION, SOLUTION INTRAVENOUS CONTINUOUS
Status: DISCONTINUED | OUTPATIENT
Start: 2019-12-05 | End: 2019-12-05 | Stop reason: HOSPADM

## 2019-12-05 RX ORDER — ACETAMINOPHEN 500 MG
1000 TABLET ORAL EVERY 6 HOURS
Status: DISCONTINUED | OUTPATIENT
Start: 2019-12-06 | End: 2019-12-06 | Stop reason: HOSPADM

## 2019-12-05 RX ORDER — DIPHENHYDRAMINE HYDROCHLORIDE 50 MG/ML
12.5 INJECTION, SOLUTION INTRAMUSCULAR; INTRAVENOUS
Status: DISCONTINUED | OUTPATIENT
Start: 2019-12-05 | End: 2019-12-05 | Stop reason: HOSPADM

## 2019-12-05 RX ORDER — OLMESARTAN MEDOXOMIL 20 MG/1
40 TABLET ORAL
Status: DISCONTINUED | OUTPATIENT
Start: 2019-12-06 | End: 2019-12-06 | Stop reason: HOSPADM

## 2019-12-05 RX ORDER — HYDROMORPHONE HYDROCHLORIDE 1 MG/ML
1 INJECTION, SOLUTION INTRAMUSCULAR; INTRAVENOUS; SUBCUTANEOUS
Status: DISCONTINUED | OUTPATIENT
Start: 2019-12-05 | End: 2019-12-06 | Stop reason: HOSPADM

## 2019-12-05 RX ORDER — LIDOCAINE HYDROCHLORIDE 10 MG/ML
0.1 INJECTION INFILTRATION; PERINEURAL AS NEEDED
Status: DISCONTINUED | OUTPATIENT
Start: 2019-12-05 | End: 2019-12-05 | Stop reason: HOSPADM

## 2019-12-05 RX ORDER — ASPIRIN 81 MG/1
81 TABLET ORAL EVERY 12 HOURS
Status: DISCONTINUED | OUTPATIENT
Start: 2019-12-05 | End: 2019-12-06 | Stop reason: HOSPADM

## 2019-12-05 RX ORDER — ONDANSETRON 4 MG/1
4 TABLET, ORALLY DISINTEGRATING ORAL
Status: DISCONTINUED | OUTPATIENT
Start: 2019-12-05 | End: 2019-12-06 | Stop reason: HOSPADM

## 2019-12-05 RX ORDER — OLMESARTAN MEDOXOMIL 20 MG/1
40 TABLET ORAL DAILY
Status: DISCONTINUED | OUTPATIENT
Start: 2019-12-05 | End: 2019-12-05

## 2019-12-05 RX ORDER — SODIUM CHLORIDE 9 MG/ML
100 INJECTION, SOLUTION INTRAVENOUS CONTINUOUS
Status: DISCONTINUED | OUTPATIENT
Start: 2019-12-05 | End: 2019-12-06 | Stop reason: HOSPADM

## 2019-12-05 RX ORDER — OXYCODONE HYDROCHLORIDE 5 MG/1
10 TABLET ORAL
Status: DISCONTINUED | OUTPATIENT
Start: 2019-12-05 | End: 2019-12-06 | Stop reason: HOSPADM

## 2019-12-05 RX ORDER — CEFAZOLIN SODIUM/WATER 2 G/20 ML
2 SYRINGE (ML) INTRAVENOUS EVERY 8 HOURS
Status: COMPLETED | OUTPATIENT
Start: 2019-12-05 | End: 2019-12-05

## 2019-12-05 RX ORDER — NALOXONE HYDROCHLORIDE 0.4 MG/ML
0.1 INJECTION, SOLUTION INTRAMUSCULAR; INTRAVENOUS; SUBCUTANEOUS AS NEEDED
Status: DISCONTINUED | OUTPATIENT
Start: 2019-12-05 | End: 2019-12-05 | Stop reason: HOSPADM

## 2019-12-05 RX ORDER — VANCOMYCIN HYDROCHLORIDE 1 G/20ML
INJECTION, POWDER, LYOPHILIZED, FOR SOLUTION INTRAVENOUS AS NEEDED
Status: DISCONTINUED | OUTPATIENT
Start: 2019-12-05 | End: 2019-12-05 | Stop reason: HOSPADM

## 2019-12-05 RX ORDER — MIDAZOLAM HYDROCHLORIDE 1 MG/ML
2 INJECTION, SOLUTION INTRAMUSCULAR; INTRAVENOUS
Status: DISCONTINUED | OUTPATIENT
Start: 2019-12-05 | End: 2019-12-05 | Stop reason: HOSPADM

## 2019-12-05 RX ORDER — BACITRACIN 50000 [IU]/1
INJECTION, POWDER, FOR SOLUTION INTRAMUSCULAR AS NEEDED
Status: DISCONTINUED | OUTPATIENT
Start: 2019-12-05 | End: 2019-12-05 | Stop reason: HOSPADM

## 2019-12-05 RX ORDER — EPHEDRINE SULFATE/0.9% NACL/PF 50 MG/5 ML
SYRINGE (ML) INTRAVENOUS AS NEEDED
Status: DISCONTINUED | OUTPATIENT
Start: 2019-12-05 | End: 2019-12-05 | Stop reason: HOSPADM

## 2019-12-05 RX ORDER — SODIUM CHLORIDE 0.9 % (FLUSH) 0.9 %
5-40 SYRINGE (ML) INJECTION EVERY 8 HOURS
Status: CANCELLED | OUTPATIENT
Start: 2019-12-05

## 2019-12-05 RX ORDER — ONDANSETRON 2 MG/ML
INJECTION INTRAMUSCULAR; INTRAVENOUS AS NEEDED
Status: DISCONTINUED | OUTPATIENT
Start: 2019-12-05 | End: 2019-12-05 | Stop reason: HOSPADM

## 2019-12-05 RX ORDER — TRANEXAMIC ACID 100 MG/ML
INJECTION, SOLUTION INTRAVENOUS AS NEEDED
Status: DISCONTINUED | OUTPATIENT
Start: 2019-12-05 | End: 2019-12-05 | Stop reason: HOSPADM

## 2019-12-05 RX ORDER — CELECOXIB 200 MG/1
200 CAPSULE ORAL EVERY 12 HOURS
Status: DISCONTINUED | OUTPATIENT
Start: 2019-12-05 | End: 2019-12-06 | Stop reason: HOSPADM

## 2019-12-05 RX ORDER — ROPIVACAINE HYDROCHLORIDE 2 MG/ML
INJECTION, SOLUTION EPIDURAL; INFILTRATION; PERINEURAL AS NEEDED
Status: DISCONTINUED | OUTPATIENT
Start: 2019-12-05 | End: 2019-12-05 | Stop reason: HOSPADM

## 2019-12-05 RX ORDER — DEXAMETHASONE SODIUM PHOSPHATE 100 MG/10ML
10 INJECTION INTRAMUSCULAR; INTRAVENOUS ONCE
Status: DISCONTINUED | OUTPATIENT
Start: 2019-12-06 | End: 2019-12-06 | Stop reason: HOSPADM

## 2019-12-05 RX ORDER — CEFAZOLIN SODIUM/WATER 2 G/20 ML
2 SYRINGE (ML) INTRAVENOUS ONCE
Status: COMPLETED | OUTPATIENT
Start: 2019-12-05 | End: 2019-12-05

## 2019-12-05 RX ORDER — DEXAMETHASONE SODIUM PHOSPHATE 4 MG/ML
INJECTION, SOLUTION INTRA-ARTICULAR; INTRALESIONAL; INTRAMUSCULAR; INTRAVENOUS; SOFT TISSUE AS NEEDED
Status: DISCONTINUED | OUTPATIENT
Start: 2019-12-05 | End: 2019-12-05 | Stop reason: HOSPADM

## 2019-12-05 RX ORDER — NALOXONE HYDROCHLORIDE 0.4 MG/ML
.2-.4 INJECTION, SOLUTION INTRAMUSCULAR; INTRAVENOUS; SUBCUTANEOUS
Status: DISCONTINUED | OUTPATIENT
Start: 2019-12-05 | End: 2019-12-06 | Stop reason: HOSPADM

## 2019-12-05 RX ORDER — PROPOFOL 10 MG/ML
INJECTION, EMULSION INTRAVENOUS
Status: DISCONTINUED | OUTPATIENT
Start: 2019-12-05 | End: 2019-12-05 | Stop reason: HOSPADM

## 2019-12-05 RX ORDER — KETOROLAC TROMETHAMINE 30 MG/ML
INJECTION, SOLUTION INTRAMUSCULAR; INTRAVENOUS AS NEEDED
Status: DISCONTINUED | OUTPATIENT
Start: 2019-12-05 | End: 2019-12-05 | Stop reason: HOSPADM

## 2019-12-05 RX ORDER — ALBUTEROL SULFATE 0.83 MG/ML
2.5 SOLUTION RESPIRATORY (INHALATION) AS NEEDED
Status: DISCONTINUED | OUTPATIENT
Start: 2019-12-05 | End: 2019-12-05 | Stop reason: HOSPADM

## 2019-12-05 RX ORDER — HYDROMORPHONE HYDROCHLORIDE 2 MG/ML
0.5 INJECTION, SOLUTION INTRAMUSCULAR; INTRAVENOUS; SUBCUTANEOUS
Status: DISCONTINUED | OUTPATIENT
Start: 2019-12-05 | End: 2019-12-05 | Stop reason: HOSPADM

## 2019-12-05 RX ORDER — AMLODIPINE BESYLATE 10 MG/1
10 TABLET ORAL
Status: DISCONTINUED | OUTPATIENT
Start: 2019-12-05 | End: 2019-12-06 | Stop reason: HOSPADM

## 2019-12-05 RX ORDER — OXYCODONE HYDROCHLORIDE 5 MG/1
10 TABLET ORAL
Status: DISCONTINUED | OUTPATIENT
Start: 2019-12-05 | End: 2019-12-05 | Stop reason: HOSPADM

## 2019-12-05 RX ORDER — DIPHENHYDRAMINE HCL 25 MG
25 CAPSULE ORAL
Status: DISCONTINUED | OUTPATIENT
Start: 2019-12-05 | End: 2019-12-06 | Stop reason: HOSPADM

## 2019-12-05 RX ORDER — AMOXICILLIN 250 MG
2 CAPSULE ORAL DAILY
Status: DISCONTINUED | OUTPATIENT
Start: 2019-12-06 | End: 2019-12-06 | Stop reason: HOSPADM

## 2019-12-05 RX ORDER — BUPIVACAINE HYDROCHLORIDE 7.5 MG/ML
INJECTION, SOLUTION INTRASPINAL
Status: COMPLETED | OUTPATIENT
Start: 2019-12-05 | End: 2019-12-05

## 2019-12-05 RX ORDER — METOPROLOL SUCCINATE 100 MG/1
200 TABLET, EXTENDED RELEASE ORAL DAILY
Status: DISCONTINUED | OUTPATIENT
Start: 2019-12-05 | End: 2019-12-06 | Stop reason: HOSPADM

## 2019-12-05 RX ORDER — FENTANYL CITRATE 50 UG/ML
INJECTION, SOLUTION INTRAMUSCULAR; INTRAVENOUS AS NEEDED
Status: DISCONTINUED | OUTPATIENT
Start: 2019-12-05 | End: 2019-12-05 | Stop reason: HOSPADM

## 2019-12-05 RX ORDER — ONDANSETRON 2 MG/ML
4 INJECTION INTRAMUSCULAR; INTRAVENOUS ONCE
Status: DISCONTINUED | OUTPATIENT
Start: 2019-12-05 | End: 2019-12-05 | Stop reason: HOSPADM

## 2019-12-05 RX ORDER — FENTANYL CITRATE 50 UG/ML
100 INJECTION, SOLUTION INTRAMUSCULAR; INTRAVENOUS ONCE
Status: DISCONTINUED | OUTPATIENT
Start: 2019-12-05 | End: 2019-12-05 | Stop reason: HOSPADM

## 2019-12-05 RX ORDER — SODIUM CHLORIDE 0.9 % (FLUSH) 0.9 %
5-40 SYRINGE (ML) INJECTION AS NEEDED
Status: DISCONTINUED | OUTPATIENT
Start: 2019-12-05 | End: 2019-12-06 | Stop reason: HOSPADM

## 2019-12-05 RX ORDER — MIDAZOLAM HYDROCHLORIDE 1 MG/ML
2 INJECTION, SOLUTION INTRAMUSCULAR; INTRAVENOUS ONCE
Status: DISCONTINUED | OUTPATIENT
Start: 2019-12-05 | End: 2019-12-05 | Stop reason: HOSPADM

## 2019-12-05 RX ORDER — MIDAZOLAM HYDROCHLORIDE 1 MG/ML
INJECTION, SOLUTION INTRAMUSCULAR; INTRAVENOUS AS NEEDED
Status: DISCONTINUED | OUTPATIENT
Start: 2019-12-05 | End: 2019-12-05 | Stop reason: HOSPADM

## 2019-12-05 RX ORDER — ACETAMINOPHEN 10 MG/ML
1000 INJECTION, SOLUTION INTRAVENOUS ONCE
Status: COMPLETED | OUTPATIENT
Start: 2019-12-05 | End: 2019-12-05

## 2019-12-05 RX ADMIN — Medication 20 MG: at 08:19

## 2019-12-05 RX ADMIN — Medication 2 G: at 08:05

## 2019-12-05 RX ADMIN — Medication 1 AMPULE: at 21:20

## 2019-12-05 RX ADMIN — ACETAMINOPHEN 1000 MG: 500 TABLET, FILM COATED ORAL at 23:26

## 2019-12-05 RX ADMIN — TRANEXAMIC ACID 1000 MG: 100 INJECTION, SOLUTION INTRAVENOUS at 08:10

## 2019-12-05 RX ADMIN — PHENYLEPHRINE HYDROCHLORIDE 100 MCG: 10 INJECTION INTRAVENOUS at 08:47

## 2019-12-05 RX ADMIN — OXYCODONE HYDROCHLORIDE 10 MG: 5 TABLET ORAL at 18:00

## 2019-12-05 RX ADMIN — PHENYLEPHRINE HYDROCHLORIDE 200 MCG: 10 INJECTION INTRAVENOUS at 08:52

## 2019-12-05 RX ADMIN — PHENYLEPHRINE HYDROCHLORIDE 200 MCG: 10 INJECTION INTRAVENOUS at 08:32

## 2019-12-05 RX ADMIN — OXYCODONE HYDROCHLORIDE 10 MG: 5 TABLET ORAL at 21:47

## 2019-12-05 RX ADMIN — OXYCODONE HYDROCHLORIDE 10 MG: 5 TABLET ORAL at 13:20

## 2019-12-05 RX ADMIN — MIDAZOLAM 2 MG: 1 INJECTION INTRAMUSCULAR; INTRAVENOUS at 08:05

## 2019-12-05 RX ADMIN — LIDOCAINE HYDROCHLORIDE 0.1 ML: 10 INJECTION, SOLUTION INFILTRATION; PERINEURAL at 06:44

## 2019-12-05 RX ADMIN — Medication 2 G: at 18:01

## 2019-12-05 RX ADMIN — ASPIRIN 81 MG: 81 TABLET, COATED ORAL at 21:20

## 2019-12-05 RX ADMIN — PHENYLEPHRINE HYDROCHLORIDE 200 MCG: 10 INJECTION INTRAVENOUS at 08:19

## 2019-12-05 RX ADMIN — SODIUM CHLORIDE, SODIUM LACTATE, POTASSIUM CHLORIDE, AND CALCIUM CHLORIDE 100 ML/HR: 600; 310; 30; 20 INJECTION, SOLUTION INTRAVENOUS at 06:45

## 2019-12-05 RX ADMIN — Medication 2 G: at 23:26

## 2019-12-05 RX ADMIN — ACETAMINOPHEN 1000 MG: 10 INJECTION, SOLUTION INTRAVENOUS at 18:01

## 2019-12-05 RX ADMIN — BUPIVACAINE HYDROCHLORIDE IN DEXTROSE 15 MG: 7.5 INJECTION, SOLUTION SUBARACHNOID at 08:08

## 2019-12-05 RX ADMIN — SODIUM CHLORIDE, SODIUM LACTATE, POTASSIUM CHLORIDE, AND CALCIUM CHLORIDE: 600; 310; 30; 20 INJECTION, SOLUTION INTRAVENOUS at 08:38

## 2019-12-05 RX ADMIN — AMLODIPINE BESYLATE 10 MG: 10 TABLET ORAL at 21:20

## 2019-12-05 RX ADMIN — PHENYLEPHRINE HYDROCHLORIDE 200 MCG: 10 INJECTION INTRAVENOUS at 09:13

## 2019-12-05 RX ADMIN — PHENYLEPHRINE HYDROCHLORIDE 200 MCG: 10 INJECTION INTRAVENOUS at 09:23

## 2019-12-05 RX ADMIN — PHENYLEPHRINE HYDROCHLORIDE 200 MCG: 10 INJECTION INTRAVENOUS at 09:00

## 2019-12-05 RX ADMIN — FENTANYL CITRATE 100 MCG: 50 INJECTION INTRAMUSCULAR; INTRAVENOUS at 08:02

## 2019-12-05 RX ADMIN — DEXAMETHASONE SODIUM PHOSPHATE 10 MG: 4 INJECTION, SOLUTION INTRAMUSCULAR; INTRAVENOUS at 08:15

## 2019-12-05 RX ADMIN — PROPOFOL 50 MCG/KG/MIN: 10 INJECTION, EMULSION INTRAVENOUS at 08:20

## 2019-12-05 RX ADMIN — HYDROMORPHONE HYDROCHLORIDE 1 MG: 1 INJECTION, SOLUTION INTRAMUSCULAR; INTRAVENOUS; SUBCUTANEOUS at 14:31

## 2019-12-05 RX ADMIN — Medication 3 AMPULE: at 06:44

## 2019-12-05 RX ADMIN — CELECOXIB 200 MG: 200 CAPSULE ORAL at 21:20

## 2019-12-05 RX ADMIN — Medication 10 MG: at 08:17

## 2019-12-05 RX ADMIN — ONDANSETRON 4 MG: 2 INJECTION INTRAMUSCULAR; INTRAVENOUS at 08:15

## 2019-12-05 NOTE — PROGRESS NOTES
TRANSFER - IN REPORT:    Verbal report received from [address] [place] [address] Josué RN(name) on Augustina Meth  being received from PACU(unit) for routine progression of care      Report consisted of patients Situation, Background, Assessment and   Recommendations(SBAR). Information from the following report(s) SBAR, Kardex, OR Summary, Procedure Summary, Intake/Output, MAR, Accordion, Recent Results and Med Rec Status was reviewed with the receiving nurse. Opportunity for questions and clarification was provided. Assessment completed upon patients arrival to unit and care assumed.

## 2019-12-05 NOTE — PROGRESS NOTES
12/05/19 1140   Oxygen Therapy   O2 Sat (%) 98 %   Pulse via Oximetry 74 beats per minute   O2 Device Room air   O2 Flow Rate (L/min) 0 l/min   Incentive Spirometry Treatment   Actual Volume (ml) 2000 ml   Number of Attempts 3     Patient achieved   2000  Ml/sec on IS. Patient encouraged to do every hour while awake-patient agreed and demonstrated. No shortness of breath or distress noted. BS are clear b/l.    [facility] notes reviewed- continuous sat  ordered HS

## 2019-12-05 NOTE — PERIOP NOTES
TRANSFER - OUT REPORT:    Verbal report given to [facility] RN on Robert Florence  being transferred to preop holding for routine progression of care       Report consisted of patients Situation, Background, Assessment and   Recommendations(SBAR). Information from the following report(s) SBAR, MAR and Recent Results was reviewed with the receiving nurse. Lines:   Peripheral IV 12/05/19 Left Arm (Active)   Site Assessment Clean, dry, & intact 12/5/2019  6:33 AM   Phlebitis Assessment 0 12/5/2019  6:33 AM   Infiltration Assessment 0 12/5/2019  6:33 AM   Dressing Status Clean, dry, & intact 12/5/2019  6:33 AM   Dressing Type Tape;Transparent 12/5/2019  6:33 AM   Hub Color/Line Status Pink; Infusing 12/5/2019  6:33 AM        Opportunity for questions and clarification was provided.       Patient transported with:   [employer]

## 2019-12-05 NOTE — PERIOP NOTES
TRANSFER - OUT REPORT:    Verbal report given to  on Ursula Mike  being transferred [address] for routine post - op   Room 312    Report consisted of patients Situation, Background, Assessment and   Recommendations(SBAR). Information from the following report(s) SBAR was reviewed with the receiving nurse. Lines:   Peripheral IV 12/05/19 Left Arm (Active)   Site Assessment Clean, dry, & intact 12/5/2019  9:45 AM   Phlebitis Assessment 0 12/5/2019  9:45 AM   Infiltration Assessment 0 12/5/2019  9:45 AM   Dressing Status Clean, dry, & intact 12/5/2019  9:45 AM   Dressing Type Transparent 12/5/2019  9:45 AM   Hub Color/Line Status Pink; Infusing 12/5/2019  9:45 AM        Opportunity for questions and clarification was provided. 2lpm nc.

## 2019-12-05 NOTE — PERIOP NOTES
Betadine lavage: 17.5cc of betadine lot # [id number], exp. Date 04/21,  in 500cc of . 9NS Lot # [id number], exp.  Date : 1apr2022

## 2019-12-05 NOTE — PERIOP NOTES
TRANSFER - IN REPORT:    Verbal report received from Patrick Rojas RN on Hortencia Ball  being received from [facility] for routine progression of care      Report consisted of patients Situation, Background, Assessment and   Recommendations(SBAR). Information from the following report(s) SBAR and MAR was reviewed with the receiving nurse. Opportunity for questions and clarification was provided. Assessment completed upon patients arrival to unit and care assumed.

## 2019-12-05 NOTE — PERIOP NOTES
Teach back method used in review of Hibiclens usage preop/postop, TB screening, pain management goals, falls precautions and use of Nozin for prevention of staph infections. Incentive spirometer in car and will review postop.

## 2019-12-05 NOTE — ANESTHESIA PREPROCEDURE EVALUATION
Relevant Problems   No relevant active problems       Anesthetic History               Review of Systems / Medical History  Patient summary reviewed, nursing notes reviewed and pertinent labs reviewed    Pulmonary                   Neuro/Psych              Cardiovascular    Hypertension: well controlled              Exercise tolerance: >4 METS     GI/Hepatic/Renal     GERD: well controlled           Endo/Other      Hypothyroidism: well controlled       Other Findings              Physical Exam    Airway  Mallampati: II  TM Distance: 4 - 6 cm  Neck ROM: normal range of motion   Mouth opening: Normal     Cardiovascular  Regular rate and rhythm,  S1 and S2 normal,  no murmur, click, rub, or gallop             Dental    Dentition: Upper partial plate     Pulmonary  Breath sounds clear to auscultation               Abdominal         Other Findings            Anesthetic Plan    ASA: 2  Anesthesia type: spinal          Induction: Intravenous  Anesthetic plan and risks discussed with: Patient

## 2019-12-05 NOTE — PROGRESS NOTES
Admission Assessment Complete. Pt had a LTHA today. Pt is A&Ox 3.  +2 pedal pulses with purposeful movement in all four extremities. Dressing is clean, dry and intact. Pt denies any pain or need at this time. Bed low and locked. Side rails x3. Call light with in reach. Pt verbalizes understanding of call light.

## 2019-12-05 NOTE — PROGRESS NOTES
Problem: Mobility Impaired (Adult and Pediatric)  Goal: *Acute Goals and Plan of Care (Insert Text)  Description  GOALS (1-4 days):  (1.)Ms. Clayton Greco will move from supine to sit and sit to supine  in bed with STAND BY ASSIST.    (2.)Ms. Winn will transfer from bed to chair and chair to bed with STAND BY ASSIST using the least restrictive device. (3.)Ms. Winn will ambulate with STAND BY ASSIST for 150 feet with the least restrictive device. (4.)Ms. Winn will ambulate up/down 4 steps with right railing with MINIMAL ASSIST with device as needed. (5.)Ms. Winn will state/observe CALVIN precautions with 0 verbal cues. ________________________________________________________________________________________________     Outcome: Progressing Towards Goal    PHYSICAL THERAPY JOINT CAMP CALVIN: Initial Assessment and PM 12/5/2019  INPATIENT: Hospital Day: 1  Payor: [employer] / Plan: [place] [employer] [place] / Product Type: PPO /      NAME/AGE/GENDER: Mena Hayden is a 69 y.o. female   PRIMARY DIAGNOSIS:  Osteoarthritis of left hip, unspecified osteoarthritis type [M16.12]   Procedure(s) and Anesthesia Type:     * LEFT TOTAL HIP ARTHROPLASTY - Spinal (Left)  ICD-10: Treatment Diagnosis:    Pain in left hip (M25.552)  Stiffness of Left Hip, Not elsewhere classified (M25.652)  Difficulty in walking, Not elsewhere classified (R26.2)      ASSESSMENT:     Ms. Clayton Greco presents with decreased functional mobility and gait as well as decreased rom and strength of left LE s/p left calvin. She plans on going home with HHPT.     This section established at most recent assessment   PROBLEM LIST (Impairments causing functional limitations):  Decreased Strength  Decreased ADL/Functional Activities  Decreased Transfer Abilities  Decreased Ambulation Ability/Technique  Decreased Balance  Increased Pain  Decreased Activity Tolerance  Decreased Flexibility/Joint Mobility  Decreased [place] with Home Exercise Program  Decreased strength   INTERVENTIONS PLANNED: (Benefits and precautions of physical therapy have been discussed with the patient.)  bed mobility  gait training  home exercise program (HEP)  Range of Motion: active/assisted/passive  Therapeutic Activities  therapeutic exercise/strengthening  transfer training  Group Therapy     TREATMENT PLAN: Frequency/Duration: Follow patient BID for duration of hospital stay to address above goals. Rehabilitation Potential For Stated Goals: Good     RECOMMENDED REHABILITATION/EQUIPMENT: (at time of discharge pending progress): Continue Skilled Therapy and Home Health: Physical Therapy. HISTORY:   History of Present Injury/Illness (Reason for Referral):  S/p left esther  Past Medical History/Comorbidities:   Ms. Kiki Cheatham  has a past medical history of Chronic musculoskeletal pain, COPD, Discoloration of skin (07/2019), GERD (gastroesophageal reflux disease), GERD (gastroesophageal reflux disease), History of back surgery, History of colon polyps, History of left bundle branch block, History of tooth extraction, History of tubal ligation, Hypertension, Hypothyroidism, Menopause, Morbid obesity ([address] OA (osteoarthritis), Skin rash, and Visit for dental examination (02/2017). She also has no past medical history of Aneurysm ([address] Arrhythmia, Asthma, Autoimmune disease ([address] CAD (coronary artery disease), Cancer ([address] Chronic kidney disease, Chronic pain, Coagulation disorder ([address] Diabetes ([address] Difficult intubation, Endocarditis, Heart failure ([address] Liver disease, Malignant hyperthermia due to anesthesia, Nausea & vomiting, Nicotine vapor product user, Non-nicotine vapor product user, Pseudocholinesterase deficiency, Psychiatric disorder, Rheumatic fever, Seizures ([address] Sleep apnea, Stroke ([address] or Thromboembolus ([address]   Ms. Kiki Cheatham  has a past surgical history that includes hx back surgery; hx heent; hx tubal ligation; pr colsc flx w/removal lesion by hot bx forceps (9/13/2013); pr colsc flx w/rmvl of tumor polyp lesion snare tq (12/19/2016); colonoscopy (N/A, 12/19/2016); hx breast biopsy (Right, 11/21/2017); and hx cataract removal (Bilateral). Social History/Living Environment:   Home Environment: Private residence  One/Two Story Residence: One story  Living Alone: No  Support Systems: Spouse/Significant Other/Partner  Patient Expects to be Discharged to[de-identified] Private residence  Current DME Used/Available at Home: None  Prior Level of Function/Work/Activity:  Using cane with gait   Number of Personal Factors/Comorbidities that affect the Plan of Care: 1-2: MODERATE COMPLEXITY   EXAMINATION:   Most Recent Physical Functioning:      Gross Assessment  AROM: Generally decreased, functional(right LE)  Strength: Generally decreased, functional(right LE)          LLE AROM  L Hip Flexion: 80  L Hip ABduction: 10          Bed Mobility  Supine to Sit: Contact guard assistance    Transfers  Sit to Stand: Minimum assistance;Contact guard assistance  Stand to Sit: Contact guard assistance  Bed to Chair: Contact guard assistance;Minimum assistance    Balance  Sitting: Intact  Standing: Pull to stand; With support    Posture  Posture Assessment: Genu valgus left;Genu valgus right         Weight Bearing Status  Left Side Weight Bearing: As tolerated  Distance (ft): 60 Feet (ft)  Assistive Device: Walker, rolling  Speed/Pati: Delayed  Step Length: Right shortened;Left shortened  Stance: Left decreased  Gait Abnormalities: Antalgic  Interventions: Safety awareness training;Verbal cues     Braces/Orthotics:            Body Structures Involved:  Bones  Joints  Muscles  Ligaments Body Functions Affected: Movement Related Activities and Participation Affected:   Mobility   Number of elements that affect the Plan of Care: 3: MODERATE COMPLEXITY   CLINICAL PRESENTATION:   Presentation: Stable and uncomplicated: LOW COMPLEXITY   CLINICAL DECISION MAKING:   [employer] AM-PAC 6 Clicks   Basic Mobility Inpatient Short Form  How much difficulty does the patient currently have. .. Unable A Lot A Little None   1. Turning over in bed (including adjusting bedclothes, sheets and blankets)? [] 1   [] 2   [x] 3   [] 4   2. Sitting down on and standing up from a chair with arms ( e.g., wheelchair, bedside commode, etc.)   [] 1   [] 2   [x] 3   [] 4   3. Moving from lying on back to sitting on the side of the bed? [] 1   [] 2   [x] 3   [] 4   How much help from another person does the patient currently need. .. Total A Lot A Little None   4. Moving to and from a bed to a chair (including a wheelchair)? [] 1   [] 2   [x] 3   [] 4   5. Need to walk in hospital room? [] 1   [] 2   [x] 3   [] 4   6. Climbing 3-5 steps with a railing? [] 1   [x] 2   [] 3   [] 4   © 2007, Trustees of [address] [place] [address], under license to "[employer]". All rights reserved     Score:  Initial: 17 Most Recent: X (Date: -- )    Interpretation of Tool:  Represents activities that are increasingly more difficult (i.e. Bed mobility, Transfers, Gait). Medical Necessity:     Patient is expected to demonstrate progress in   strength, range of motion, and balance   to   decrease assistance required with exercises and functional mobility   . Reason for Services/Other Comments:  Patient   continues to require present interventions due to patient's inability to perform exercises and functional mobility independently   . Use of outcome tool(s) and clinical judgement create a POC that gives a: Clear prediction of patient's progress: LOW COMPLEXITY            TREATMENT:   (In addition to Assessment/Re-Assessment sessions the following treatments were rendered)     Pre-treatment Symptoms/Complaints:  no complaints  Pain Initial:      Post Session:  0     Therapeutic Exercise: (10 Minutes):  Exercises per grid below to improve mobility and strength.   Required minimal visual, verbal, and manual cues to promote proper body alignment, promote proper body posture, and promote proper body mechanics. Progressed range and repetitions as indicated. Date:  12/5 Date:   Date:     ACTIVITY/EXERCISE AM PM AM PM AM PM   GROUP THERAPY  []  []  []  []  []  []   Ankle Pumps  10a       Quad Sets  10a       Gluteal Sets  10a       Hip ABd/ADduction  10aa       Straight Leg Raises         Knee Slides  10a       Short Arc Quads         Long Arc Quads         Chair Slides                  B = bilateral; AA = active assistive; A = active; P = passive      Treatment/Session Assessment:     Response to Treatment:  Pt. Did well. Education:  [x] Home Exercises  [x] Fall Precautions  [x] Hip Precautions [] D/C Instruction Review  [x] Hip Prosthesis Review  [x] Walker Management/Safety [] Adaptive Equipment as Needed       Interdisciplinary Collaboration:   Occupational Therapist  Registered Nurse    After treatment position/precautions:   Up in chair  Bed/Chair-wheels locked  Bed in low position  Call light within reach    Compliance with Program/Exercises: Will assess as treatment progresses. No questions    Recommendations/Intent for next treatment session:  Treatment next visit will focus on increasing Ms. Winn's independence with bed mobility, transfers, gait training, strength/ROM exercises, modalities for pain, and patient education.       Total Treatment Duration:  PT Patient Time In/Time Out  Time In: 1325  Time Out: [address] PT

## 2019-12-05 NOTE — PROGRESS NOTES
Problem: Self Care Deficits Care Plan (Adult)  Goal: *Acute Goals and Plan of Care (Insert Text)  Description  GOALS:   DISCHARGE GOALS (in preparation for going home/rehab):  3 days  1. Ms. Jeanie Powell will perform one lower body dressing activity with minimal assistance with adaptive equipment to demonstrate improved functional mobility and safety. 2.  Ms. Jeanie Powell will perform one lower body bathing activity with minimal  assistance with adaptive equipment to demonstrate improved functional mobility and safety. 3.  Ms. Jeanie Powell will perform toileting/toilet transfer with contact guard assistance with adaptive equipment to demonstrate improved functional mobility and safety. 4.  Ms. Jeanie Powell will perform shower transfer with contact guard assistance with adaptive equipment to demonstrate improved functional mobility and safety. 5.  Ms. Jeanie Powell will state CALVIN precautions with two verbal cues to demonstrate improved functional mobility and safety. Outcome: Progressing Towards Goal       JOINT CAMP OCCUPATIONAL THERAPY CALVIN: Initial Assessment, Daily Note, Treatment Day: Day of Assessment, and PM 12/5/2019  INPATIENT: Hospital Day: 1  Payor: [employer] / Plan: [place] [facility] / Product Type: PPO /      NAME/AGE/GENDER: Travis Rodriguez is a 69 y.o. female   PRIMARY DIAGNOSIS:  Osteoarthritis of left hip, unspecified osteoarthritis type [M16.12]   Procedure(s) and Anesthesia Type:     * LEFT TOTAL HIP ARTHROPLASTY - Spinal (Left)  ICD-10: Treatment Diagnosis:    Pain in left hip (M25.552)  Stiffness of Left Hip, Not elsewhere classified (Q87.022)      ASSESSMENT:     Ms. Jeanie Powell is s/p left CALVIN and presents with decreased weight bearing on left LE and decreased independence with functional mobility and activities of daily living as compared to prior level of function and safety.   Patient would benefit from skilled Occupational Therapy to maximize independence and safety with self-care task and functional mobility. Pt would also benefit from education on lower body adaptive equipment and hip precautions post-surgery in preparation for going home. Patient plans for further rehab at home with home health services and good family support family. OT reviewed therapy schedule and plan of care with patient. Patient was able to transfer and perform self care skills as charted below. Patient instructed to call for assistance when needing to get up from the recliner and all needs in reach. Patient verbalized understanding of call light. Pt up in room and donned clothes. Pt moves very well and should progress well tomorrow. This section established at most recent assessment   PROBLEM LIST (Impairments causing functional limitations):  Decreased Strength  Decreased ADL/Functional Activities  Decreased Transfer Abilities  Increased Pain  Increased Fatigue  Decreased Flexibility/Joint Mobility  Decreased Knowledge of Precautions   INTERVENTIONS PLANNED: (Benefits and precautions of occupational therapy have been discussed with the patient.)  Activities of daily living training  Adaptive equipment training  Balance training  Clothing management  Donning&doffing training  Theraputic activity     TREATMENT PLAN: Frequency/Duration: Follow patient 1-2 times to address above goals. Rehabilitation Potential For Stated Goals: Good     RECOMMENDED REHABILITATION/EQUIPMENT: (at time of discharge pending progress): Continue Skilled Therapy. OCCUPATIONAL PROFILE AND HISTORY:   History of Present Injury/Illness (Reason for Referral): Pt presents this date s/p (left) CALVIN.     Past Medical History/Comorbidities:   Ms. Samreen De Santiago  has a past medical history of Chronic musculoskeletal pain, COPD, Discoloration of skin (07/2019), GERD (gastroesophageal reflux disease), GERD (gastroesophageal reflux disease), History of back surgery, History of colon polyps, History of left bundle branch block, History of tooth extraction, History of tubal ligation, Hypertension, Hypothyroidism, Menopause, Morbid obesity ([address] OA (osteoarthritis), Skin rash, and Visit for dental examination (02/2017). She also has no past medical history of Aneurysm ([address] Arrhythmia, Asthma, Autoimmune disease ([address] CAD (coronary artery disease), Cancer ([address] Chronic kidney disease, Chronic pain, Coagulation disorder ([address] Diabetes ([address] Difficult intubation, Endocarditis, Heart failure ([address] Liver disease, Malignant hyperthermia due to anesthesia, Nausea & vomiting, Nicotine vapor product user, Non-nicotine vapor product user, Pseudocholinesterase deficiency, Psychiatric disorder, Rheumatic fever, Seizures ([address] Sleep apnea, Stroke ([address] or Thromboembolus ([address] Ms. Luis Carlos Donohue  has a past surgical history that includes hx back surgery; hx heent; hx tubal ligation; pr colsc flx w/removal lesion by hot bx forceps (9/13/2013); pr colsc flx w/rmvl of tumor polyp lesion snare tq (12/19/2016); colonoscopy (N/A, 12/19/2016); hx breast biopsy (Right, 11/21/2017); and hx cataract removal (Bilateral). Social History/Living Environment:   Home Environment: Private residence  One/Two Story Residence: One story  Living Alone: No  Support Systems: Spouse/Significant Other/Partner  Patient Expects to be Discharged to[de-identified] Private residence  Current DME Used/Available at Home: None  Prior Level of Function/Work/Activity:  Independent      Number of Personal Factors/Comorbidities that affect the Plan of Care: Brief history (0):  LOW COMPLEXITY   ASSESSMENT OF OCCUPATIONAL PERFORMANCE[de-identified]   Most Recent Physical Functioning:   Balance  Sitting: Intact  Standing: Pull to stand; With support       Gross Assessment  AROM: Generally decreased, functional(right LE)  Strength: Generally decreased, functional(right LE)          LLE AROM  L Hip Flexion: 80  L Hip ABduction: 10 Coordination  Fine Motor Skills-Upper: Left Intact; Right Intact  Gross Motor Skills-Upper: Left Intact; Right Intact         Mental Status  Neurologic State: Alert; Responds to noxious stimuli only  Orientation Level: Oriented X4  Cognition: Appropriate decision making  Perception: Appears intact  Perseveration: No perseveration noted  Safety/Judgement: Awareness of environment                Basic ADLs (From Assessment) Complex ADLs (From Assessment)   Basic ADL  Feeding: Independent  Oral Facial Hygiene/Grooming: Supervision  Bathing: Moderate assistance  Upper Body Dressing: Supervision  Lower Body Dressing: Moderate assistance  Toileting: Supervision     Grooming/Bathing/Dressing Activities of Daily Living     Cognitive Retraining  Safety/Judgement: Awareness of environment                 Functional Transfers  Bathroom Mobility: Contact guard assistance  Toilet Transfer : Minimum assistance  Shower Transfer: Minimum assistance     Bed/Mat Mobility  Supine to Sit: Contact guard assistance  Sit to Stand: Contact guard assistance  Stand to Sit: Contact guard assistance  Bed to Chair: Contact guard assistance         Physical Skills Involved:  Range of Motion  Balance  Strength Cognitive Skills Affected (resulting in the inability to perform in a timely and safe manner):  none  Psychosocial Skills Affected:  Environmental Adaptation   Number of elements that affect the Plan of Care: 3-5:  MODERATE COMPLEXITY   CLINICAL DECISION MAKIN[address] [place], [place] [id number] AM-PAC 6 Clicks   Daily Activity Inpatient Short Form  How much help from another person does the patient currently need. .. Total A Lot A Little None   1. Putting on and taking off regular lower body clothing? [] 1   [x] 2   [] 3   [] 4   2. Bathing (including washing, rinsing, drying)? [] 1   [x] 2   [] 3   [] 4   3. Toileting, which includes using toilet, bedpan or urinal?   [] 1   [] 2   [x] 3   [] 4   4. Putting on and taking off regular upper body clothing? [] 1   [] 2   [] 3   [x] 4   5. Taking care of personal grooming such as brushing teeth?    [] 1 [] 2   [] 3   [x] 4   6. Eating meals? [] 1   [] 2   [] 3   [x] 4   © 2007, Trustees of [address] [place] [address] [address] [address], under license to [employer]. All rights reserved     Score:  Initial: 19 Most Recent: X (Date: -- )    Interpretation of Tool:  Represents activities that are increasingly more difficult (i.e. Bed mobility, Transfers, Gait). Use of outcome tool(s) and clinical judgement create a POC that gives a: LOW COMPLEXITY            TREATMENT:   (In addition to Assessment/Re-Assessment sessions the following treatments were rendered)     Pre-treatment Symptoms/Complaints:    Pain: Initial:      Post Session:  0     Self Care: (10 min): Procedure(s) (per grid) utilized to improve and/or restore self-care/home management as related to dressing. Required minimal verbal, manual, and   cueing to facilitate activities of daily living skills and compensatory activities. OT evaluation completed   Treatment/Session Assessment:     Response to Treatment:  up in room tolerated well. Education:  [] Home Exercises  [x] Fall Precautions  [x] Hip Precautions [] Going Home Video  [] Knee/Hip Prosthesis Review  [x] Walker Management/Safety [x] Adaptive Equipment as Needed       Interdisciplinary Collaboration:   Physical Therapist  Occupational Therapist  Registered Nurse    After treatment position/precautions:   Up in chair  Bed/Chair-wheels locked  Call light within reach  RN notified     Compliance with Program/Exercises: Compliant all of the time, Will assess as treatment progresses. Recommendations/Intent for next treatment session:  Treatment next visit will focus on increasing Ms. Winn's independence with bed mobility, transfers, self care, functional mobility, modalities for pain, and patient education.       Total Treatment Duration:  OT Patient Time In/Time Out  Time In: 1305  Time Out: [address] Karolina Humphrey

## 2019-12-05 NOTE — OP NOTES
Total Hip Procedure Note               Gwendolyn Bustos, [id number], 1945    Pre-operative Diagnosis: Osteoarthritis of left hip, unspecified osteoarthritis type [M16.12]      Post-operative Diagnosis: Osteoarthritis of left hip, unspecified osteoarthritis type [M16.12]     Procedure: Left Total Hip Arthroplasty     BMI: Body mass index is 35.7 kg/m². Eladio López Location: [address] [place] [address]      Surgeon: Neil Guzman MD      Assistant: SIMÓN Avila    Anesthesia: Spinal  Modifier: 22  BMI:Body mass index is 35.7 kg/m². EBL: 200 cc     Procedure: Total Hip Arthroplasty    The complexity of total joint surgery requires the use of a skilled first assistant for positioning, retraction and assistance in closure. This BMI for this patient is Body mass index is 35.7 kg/m². Eladio López BMI's between 30 and 38.9 are considered obese, while a BMI over 39 indicates the patient is morbidly obese. Obese and Morbidly obese patients make exposure and retraction extremely difficult. The patient's large size also makes implantation and proper insertion of total joint implants more difficult. Gwendolyn Bustos was brought to the operating room and positioned on the operating table. Anesthesia was administered. A simon catheter was placed preoperatively and IV antibiotics were administered, along with IV transexamic acid. A time out identifying the patient, procedure, operative side and surgeon was administered and charted by the OR Nurse. Gwendolyn Bustos was positioned on left lateral decubitus position. The limb was prepped and draped in the usual sterile fashion. The Posterior approach was utilized to expose the hip. The incision was carried through the subcutaneous tissue and underlying fascia with hemostasis obtained using the bovie cauterization. A Charmley retractor was inserted. The short external rotators were divided at their insertion. The sciatic nerve was palpated and identified.  Next, a T-shaped capsular incision was accomplished and femoral head was dislocated. The articular surface revealed loss of cartilage, exposed bone and bone spurring. The neck was osteotomized in the appropriate position just above the lesser trochanteric region. The neck cut was measured to be 6 mm. Acetabular retractors were placed and the appropriate capsulotomy performed. Soft tissue was removed from the acetabulum. The acetabular surface revealed loss of cartilage with exposed bone. The acetabulum was sequentially reamed. Using trial components, the acetabulum was sized to a 54 mm Keller acetabular cup. The acetabular component was impacted to achieve appropriate horizontal tilt, anteversion, coverage, and stability. 1 screw was  used to stabilize the cup. The polyethelene liner was impacted into position. Utilizing the femoral retractor, the canal was prepared with appropriate lateralization with the starter rasp. The canal was then broached progressively. The broach was positioned with the appropriate anatomic femoral anteversion. A calcar planar was utilized. A trial reduction with a +5  Biolox neck length was utilized. This was found to be the most stable to flexion greater than 90 degrees and on internal and external rotation. Limb lengths were thought to be equilibrated and with appropriate stability as mentioned above. All trial components were removed. The cementless permanent size 8 std offset ACTIS  was impacted into place. A trial reduction was performed and the above neck length was selected. The permanent femoral head was impacted into place. Gloria Winn's hip was reduced and stability was as mentioned above. Copious irrigation was accomplished about the surgical site. The sciatic nerve was palpated and noted to be intact. The capsule was repaired with a #1 PDS and the external rotators were reattached with a #5 Mersilene.      A lavage of diluted betadine solution of 17.5 ml Betadine in 500 of 0.9% Normal Saline was allowed to soak in the wound for 3 minutes after implanting of the prosthesis. The wound was irrigated with Saline again before closure. The joint and skin areas were sprinkled with 1 gm of vancomycin powder. Prior to the final skin closure, full strength betadine was applied to the skin surrounding the skin incision. The operative hip was injected prior to closure for post operative pain management. A #1 PDS suture was used to re-approximate the fascia. 60 cc of transexamic acid was injected under the fascia for hemostasis. Monocryl sutures were used to approximate the subcutaneous layers. Staples were used to reapproximate the skin edges and a sterile bandage was placed. The patient was then rolled to a supine position. The sponge and needle counts were correct. The patient tolerated the procedure without difficulty and left the operating room in satisfactory condition. Implants:   Implant Name Type Inv. Item Serial No.  Lot No. LRB No. Used   SHELL PINNCL 54MM MULHL GRIPTN - [id number]  SHELL PINNCL 54MM MULHL GRIPTN [id number] [facility] [id number] Left 1   ALTRX NEUT [id number] - [id number]  ALTRX NEUT [id number] [facility] [id number] Left 1   SCREW BONE FEM CANC 6. [id number] - [id number]  SCREW BONE FEM CANC 6. [id number] [facility] [id number] Left 1   STEM FEM TAPR SZ8 STD OFFSET -- ACTIS - [id number]  STEM FEM TAPR SZ8 STD OFFSET -- ACTIS [id number] [facility] [id number] Left 1   HEAD FEM 36 ARTICL/EZ+5 BIOLOX - [id number]  HEAD FEM 36 ARTICL/EZ+5 BIOLOX [id number] [facility] [id number] Left 1        By: Tung Alvarado MD

## 2019-12-05 NOTE — ANESTHESIA POSTPROCEDURE EVALUATION
Procedure(s):  LEFT TOTAL HIP ARTHROPLASTY.     spinal    Anesthesia Post Evaluation      Multimodal analgesia: multimodal analgesia used between 6 hours prior to anesthesia start to PACU discharge  Patient location during evaluation: PACU  Patient participation: complete - patient participated  Level of consciousness: awake and awake and alert  Pain management: adequate  Airway patency: patent  Anesthetic complications: no  Cardiovascular status: acceptable  Respiratory status: acceptable  Hydration status: acceptable  Post anesthesia nausea and vomiting:  controlled      Vitals Value Taken Time   /55 12/5/2019 10:10 AM   Temp 36.9 °C (98.5 °F) 12/5/2019  9:42 AM   Pulse 65 12/5/2019 10:10 AM   Resp 16 12/5/2019 10:10 AM   SpO2 94 % 12/5/2019 10:10 AM

## 2019-12-05 NOTE — ANESTHESIA PROCEDURE NOTES
Spinal Block    Start time: 12/5/2019 8:02 AM  End time: 12/5/2019 8:08 AM  Performed by: Ellen Vazquez MD  Authorized by: Ellen Vazquez MD     Pre-procedure:   Indications: primary anesthetic  Preanesthetic Checklist: patient identified, risks and benefits discussed, anesthesia consent, site marked, patient being monitored and timeout performed    Timeout Time: 08:02          Spinal Block:   Patient Position:  Left lateral decubitus  Prep Region:  Lumbar  Prep: chlorhexidine      Location:  L3-4  Technique:  Single shot    Local Dose (mL):  3    Needle:   Needle Type:  Pencan  Needle Gauge:  25 G  Attempts:  1      Events: CSF confirmed, no blood with aspiration and no paresthesia        Assessment:  Insertion:  Uncomplicated  Patient tolerance:  Patient tolerated the procedure well with no immediate complications

## 2019-12-05 NOTE — H&P
[address] [facility]  History and Physical Exam    Patient ID:  Yumi Bernard  [id number]    9 y.o.  1945    Today: December 5, 2019    Vitals Signs: Reviewed as noted in medical record. Allergies: No Known Allergies    CC: Left hip pain    HPI:  Pt complains of left hip pain with difficulty ambulating. Relevant PMH:   Past Medical History:   Diagnosis Date    Chronic musculoskeletal pain     COPD     minor; used to smoke; no inhalers; discovered after a bout of pneumonia    Discoloration of skin 07/2019    abdomen down to upper thighs; does not itch; pcp aware    GERD (gastroesophageal reflux disease)     at night, takes otc meds prn    GERD (gastroesophageal reflux disease)     History of back surgery     History of colon polyps     History of left bundle branch block     History of tooth extraction     History of tubal ligation     Hypertension     controlled with medications    Hypothyroidism     Menopause     Morbid obesity ([address]     OA (osteoarthritis)     back, fingers right hand    Skin rash     diagnosed with \"prickly heat rash\" 7/19; given Kenalog cream    Visit for dental examination 02/2017    every 3 months       Objective:                    HEENT: NC/AT                   Lungs:  clear                   Heart:   rrr                   Abdomen: soft                   Extremities:  Pain with rom of the left hip joint    Radiographs: reveal osteoarthritis with loss of joint space and bone spurs. Assessment: Osteoarthritis of left hip, unspecified osteoarthritis type [M16.12]    Plan:  Proceed with scheduled Procedure(s) (LRB):  LEFT TOTAL HIP ARTHROPLASTY/ DEPUY (Left) . The patient has failed conservative treatment including NSAIDS, and injections. Due to the amount of pain the patient is experiencing we will proceed with scheduled procedure.       Signed By: SIMÓN Del Valle  December 5, 2019

## 2019-12-06 VITALS
SYSTOLIC BLOOD PRESSURE: 149 MMHG | WEIGHT: 208 LBS | DIASTOLIC BLOOD PRESSURE: 67 MMHG | RESPIRATION RATE: 16 BRPM | BODY MASS INDEX: 35.51 KG/M2 | OXYGEN SATURATION: 96 % | HEIGHT: 64 IN | HEART RATE: 69 BPM | TEMPERATURE: 98 F

## 2019-12-06 LAB — HGB BLD-MCNC: 12.6 G/DL (ref 11.7–15.4)

## 2019-12-06 PROCEDURE — 97535 SELF CARE MNGMENT TRAINING: CPT

## 2019-12-06 PROCEDURE — 85018 HEMOGLOBIN: CPT

## 2019-12-06 PROCEDURE — 97150 GROUP THERAPEUTIC PROCEDURES: CPT

## 2019-12-06 PROCEDURE — 36415 COLL VENOUS BLD VENIPUNCTURE: CPT

## 2019-12-06 PROCEDURE — 97110 THERAPEUTIC EXERCISES: CPT

## 2019-12-06 PROCEDURE — 97116 GAIT TRAINING THERAPY: CPT

## 2019-12-06 PROCEDURE — 74011250637 HC RX REV CODE- 250/637: Performed by: PHYSICIAN ASSISTANT

## 2019-12-06 RX ORDER — ASPIRIN 81 MG/1
81 TABLET ORAL EVERY 12 HOURS
Qty: 60 TAB | Refills: 0 | Status: SHIPPED | OUTPATIENT
Start: 2019-12-06

## 2019-12-06 RX ORDER — OXYCODONE HYDROCHLORIDE 10 MG/1
10 TABLET ORAL
Qty: 50 TAB | Refills: 0 | Status: SHIPPED | OUTPATIENT
Start: 2019-12-06 | End: 2019-12-09

## 2019-12-06 RX ADMIN — OXYCODONE HYDROCHLORIDE 10 MG: 5 TABLET ORAL at 05:02

## 2019-12-06 RX ADMIN — LEVOTHYROXINE SODIUM 175 MCG: 125 TABLET ORAL at 05:03

## 2019-12-06 RX ADMIN — ACETAMINOPHEN 1000 MG: 500 TABLET, FILM COATED ORAL at 12:27

## 2019-12-06 RX ADMIN — CELECOXIB 200 MG: 200 CAPSULE ORAL at 08:19

## 2019-12-06 RX ADMIN — PANTOPRAZOLE SODIUM 40 MG: 40 TABLET, DELAYED RELEASE ORAL at 05:03

## 2019-12-06 RX ADMIN — OXYCODONE HYDROCHLORIDE 10 MG: 5 TABLET ORAL at 12:28

## 2019-12-06 RX ADMIN — ASPIRIN 81 MG: 81 TABLET, COATED ORAL at 08:20

## 2019-12-06 RX ADMIN — Medication 1 AMPULE: at 08:19

## 2019-12-06 RX ADMIN — SENNOSIDES AND DOCUSATE SODIUM 2 TABLET: 8.6; 5 TABLET ORAL at 08:20

## 2019-12-06 RX ADMIN — METOPROLOL SUCCINATE 200 MG: 100 TABLET, EXTENDED RELEASE ORAL at 08:19

## 2019-12-06 RX ADMIN — ACETAMINOPHEN 1000 MG: 500 TABLET, FILM COATED ORAL at 05:02

## 2019-12-06 NOTE — PROGRESS NOTES
Shift Assessment Complete. Pt is post op day 1 from [address] [place] [address]. Pt is A&Ox 3.  +2 pedal pulses with purposeful movement in all four extremities. Dressing is clean, dry and intact. Pt denies any pain or need at this time. Bed low and locked. Side rails x3. Call light with in reach. Pt verbalizes understanding of call light.

## 2019-12-06 NOTE — PROGRESS NOTES
Care Management Interventions  PCP Verified by CM: Yes  Mode of Transport at Discharge: Self  Transition of Care Consult (CM Consult): [address]: Yes  Physical Therapy Consult: Yes  Occupational Therapy Consult: Yes  Current Support Network: Lives with Spouse  Confirm Follow Up Transport: Family  Plan discussed with Pt/Family/Caregiver: Yes  Freedom of Choice Offered: Yes  Discharge Location  Discharge Placement: Home with home health    Patient is a 69y.o. year old female admitted for Left CALVIN . Patient plans to return home on discharge. Order received to arrange home health. Patient without preference towards agency. Referral sent to [facility]. Patient  has a walker. Patient requesting we arrange a bedside commode. Referral sent to [address] [place] [address]. will be delivered to the hospital room prior to discharge. The Plan for Transition of Care is related to the following treatment goals: independent care. The Patient  was provided with a choice of provider and agrees   with the discharge plan. [x] Yes [] No    Freedom of choice list was provided with basic dialogue that supports the patient's individualized plan of care/goals, treatment preferences and shares the quality data associated with the providers.  [x] Yes [] No

## 2019-12-06 NOTE — DISCHARGE INSTRUCTIONS
[facility]   Patient Discharge Instructions    Mena Hayden / [id number] : 1945    Admitted 2019 Discharged: 2019     IF YOU HAVE ANY PROBLEMS ONCE YOU ARE AT HOME CALL THE FOLLOWING NUMBERS:   Main office number: [phone]    Take Home Medications     · It is important that you take the medication exactly as they are prescribed. · Keep your medication in the bottles provided by the pharmacist and keep a list of the medication names, dosages, and times to be taken in your wallet. · Do not take other medications without consulting your doctor. What to do at [address] your prehospital diet. If you have excessive nausea or vomitting call your doctor's office     Home Physical Therapy is arranged. Use rolling walker when walking. Patients who have had a joint replacement should not drive until you are seen for your follow up appointment by Dr. Tamara Farfan. When to Call    - Call if you have a temperature greater then 101  - Unable to keep food down  - Loose control of your bladder or bowel function  - Are unable to bear any weight   - Need a pain medication refill       DISCHARGE SUMMARY from Nurse    The following personal items collected during your admission are returned to you:   Dental Appliance: Dental Appliances: Partials, Uppers, With patient  Vision: Visual Aid: Glasses  Hearing Aid:    Jewelry: Jewelry: None  Clothing:    Other Valuables: Other Valuables: Cell Phone(with spouse)  Valuables sent to safe:      PATIENT INSTRUCTIONS:    After general anesthesia or intravenous sedation, for 24 hours or while taking prescription Narcotics:  · Limit your activities  · Do not drive and operate hazardous machinery  · Do not make important personal or business decisions  · Do  not drink alcoholic beverages  · If you have not urinated within 8 hours after discharge, please contact your surgeon on call.     Report the following to your surgeon:  · Excessive pain, swelling, redness or odor of or around the surgical area  · Temperature over 101  · Nausea and vomiting lasting longer than 4 hours or if unable to take medications  · Any signs of decreased circulation or nerve impairment to extremity: change in color, persistent  numbness, tingling, coldness or increase pain  · Any questions, call office @ [phone]      Keep scheduled follow up appointment. If need to change, call office @ [phone]. *  Please give a list of your current medications to your Primary Care Provider. *  Please update this list whenever your medications are discontinued, doses are      changed, or new medications (including over-the-counter products) are added. *  Please carry medication information at all times in case of emergency situations. Patient Education        Hip Replacement Surgery (Posterior): What to Expect at Home  Your Recovery  Hip replacement surgery replaces the worn parts of your hip joint. When you leave the hospital, you will probably be walking with crutches or a walker. You may be able to climb a few stairs and get in and out of bed and chairs. But you will need someone to help you at home for the next few weeks or until you have more energy and can move around better. If you need more extensive rehab, you may go to a specialized rehab center for more treatment. You will go home with a bandage and stitches, staples, tissue glue, or tape strips. You can remove the bandage when your doctor tells you to. If you have stitches or staples, your doctor will remove them 10 days to 3 weeks after your surgery. Glue or tape strips will fall off on their own over time. You may still have some mild pain, and the area may be swollen for 3 to 4 months after surgery. Your doctor will give you medicine for the pain.   You will continue the rehabilitation program (rehab) you started in the hospital. The better you do with your rehab exercises, the sooner you will get your strength and movement back. Most people are able to return to work 4 weeks to 4 months after surgery. This care sheet gives you a general idea about how long it will take for you to recover. But each person recovers at a different pace. Follow the steps below to get better as quickly as possible. How can you care for yourself at home? Activity    · Your doctor may not want your affected leg to cross the center of your body toward the other leg. If so, your therapist may suggest these ideas:  ? Do not cross your legs. ? Be very careful as you get in or out of bed or a car, so your leg does not cross that imaginary line in the middle of your body.     · Rest when you feel tired. You may take a nap, but do not stay in bed all day.     · Work with your physical therapist to learn the best way to exercise. You will probably have to use crutches or a walker for at least 4 to 6 weeks.     · Your doctor may advise you to stay away from activities that put stress on the joint. This includes sports such as tennis, football, and jogging.     · Try not to sit for too long at one time. You will feel less stiff if you take a short walk about every hour. When you sit, use chairs with arms, and do not sit in low chairs.     · Do not bend over more than 90 degrees (like the angle in a letter \"L\").     · Sleep on your back with your legs slightly apart or on your side with a pillow between your knees for about 6 weeks or as your doctor tells you. Do not sleep on your stomach or affected leg.     · Ask your doctor when you can drive again.     · Most people are able to return to work 4 weeks to 4 months after surgery.     · Ask your doctor when it is okay for you to have sex. Diet    · By the time you leave the hospital, you will probably be eating your normal diet. If your stomach is upset, try bland, low-fat foods like plain rice, broiled chicken, toast, and yogurt.  Your doctor may recommend that you take iron and vitamin supplements.     · Drink plenty of fluids (unless your doctor tells you not to).   · Eat healthy foods, and watch your portion sizes. Try to stay at your ideal weight. Too much weight puts more stress on your new hip joint.     · You may notice that your bowel movements are not regular right after your surgery. This is common. Try to avoid constipation and straining with bowel movements. You may want to take a fiber supplement every day. If you have not had a bowel movement after a couple of days, ask your doctor about taking a mild laxative. Medicines    · Your doctor will tell you if and when you can restart your medicines. He or she will also give you instructions about taking any new medicines.     · If you take blood thinners, such as warfarin (Coumadin), clopidogrel (Plavix), or aspirin, be sure to talk to your doctor. He or she will tell you if and when to start taking those medicines again. Make sure that you understand exactly what your doctor wants you to do.     · Your doctor may give you a blood-thinning medicine to prevent blood clots. If you take a blood thinner, be sure you get instructions about how to take your medicine safely. Blood thinners can cause serious bleeding problems. This medicine could be in pill form or as a shot (injection). If a shot is necessary, your doctor will tell you how to do this.     · Be safe with medicines. Take pain medicines exactly as directed. ? If the doctor gave you a prescription medicine for pain, take it as prescribed. ? If you are not taking a prescription pain medicine, ask your doctor if you can take an over-the-counter medicine.     · If you think your pain medicine is making you sick to your stomach:  ? Take your medicine after meals (unless your doctor has told you not to). ? Ask your doctor for a different pain medicine.     · If your doctor prescribed antibiotics, take them as directed. Do not stop taking them just because you feel better.  You need to take the full course of antibiotics. Incision care    · If your doctor told you how to care for your cut (incision), follow your doctor's instructions. You will have a dressing over the cut. A dressing helps the incision heal and protects it. Your doctor will tell you how to take care of this.     · If you did not get instructions, follow this general advice:  ? If you have strips of tape on the cut the doctor made, leave the tape on for a week or until it falls off.  ? If you have stitches or staples, your doctor will tell you when to come back to have them removed. ? If you have skin adhesive on the cut, leave it on until it falls off. Skin adhesive is also called glue or liquid stitches. ? Change the bandage every day. ? Wash the area daily with warm water, and pat it dry. Don't use hydrogen peroxide or alcohol. They can slow healing. ? You may cover the area with a gauze bandage if it oozes fluid or rubs against clothing. ? You may shower 24 to 48 hours after surgery. Pat the incision dry. Don't swim or take a bath for the first 2 weeks, or until your doctor tells you it is okay. Exercise    · Your rehab program will include a number of exercises to do. Always do them as your therapist tells you. Ice and elevation    · For pain, put ice or a cold pack on the area for 10 to 20 minutes at a time. Put a thin cloth between the ice and your skin.     · Your ankle may swell for about 3 months. Prop up your ankle when you ice it or anytime you sit or lie down. Try to keep it above the level of your heart. This will help reduce swelling. Other instructions   Continue to wear your support stockings as your doctor says. These help to prevent blood clots. The length of time that you will have to wear them depends on your activity level and the amount of swelling you have. Most people wear these stockings for 4 to 6 weeks after surgery.   Preventing falls is also very important.  To prevent falls:    · Arrange furniture so that you will not trip on it.     · Get rid of throw rugs, and move electrical cords out of the way.     · Walk only in areas with plenty of light.     · Put grab bars in showers and bathtubs.     · Avoid icy or snowy sidewalks.     · Wear shoes with sturdy, flat soles. Follow-up care is a key part of your treatment and safety. Be sure to make and go to all appointments, and call your doctor if you are having problems. It's also a good idea to know your test results and keep a list of the medicines you take. When should you call for help? Call 911 anytime you think you may need emergency care. For example, call if:    · You passed out (lost consciousness).     · You have severe trouble breathing.     · You have sudden chest pain and shortness of breath, or you cough up blood.    Call your doctor now or seek immediate medical care if:    · You have signs that your hip may be dislocated, including:  ? Severe pain and not being able to stand. ? A crooked leg that looks like your hip is out of position. ? Not being able to bend or straighten your leg.     · Your leg or foot is cool or pale or changes color.     · You cannot feel or move your leg.     · You have signs of a blood clot, such as:  ? Pain in your calf, back of the knee, thigh, or groin. ? Redness and swelling in your leg or groin.     · Your incision comes open and begins to bleed, or the bleeding increases.     · You feel like your heart is racing or beating irregularly.     · You have signs of infection, such as:  ? Increased pain, swelling, warmth, or redness. ? Red streaks leading from the incision. ? Pus draining from the incision. ? A fever.    Watch closely for changes in your health, and be sure to contact your doctor if:    · You do not have a bowel movement after taking a laxative.     · You do not get better as expected. Where can you learn more? Go to http://dakota-claude.info/.   Enter B902 in the search box to learn more about \"Hip Replacement Surgery (Posterior): What to Expect at Home. \"  Current as of: June 26, 2019  Content Version: 12.2  © 4762-4526 [employer]. Care instructions adapted under license by [employer] (which disclaims liability or warranty for this information). If you have questions about a medical condition or this instruction, always ask your healthcare professional. [address] any warranty or liability for your use of this information. These are general instructions for a healthy lifestyle:    No smoking/ No tobacco products/ Avoid exposure to second hand smoke    Surgeon General's Warning:  Quitting smoking now greatly reduces serious risk to your health. Obesity, smoking, and sedentary lifestyle greatly increases your risk for illness    A healthy diet, regular physical exercise & weight monitoring are important for maintaining a healthy lifestyle    You may be retaining fluid if you have a history of heart failure or if you experience any of the following symptoms:  Weight gain of 3 pounds or more overnight or 5 pounds in a week, increased swelling in our hands or feet or shortness of breath while lying flat in bed. Please call your doctor as soon as you notice any of these symptoms; do not wait until your next office visit. Recognize signs and symptoms of STROKE:    F-face looks uneven    A-arms unable to move or move even    S-speech slurred or non-existent    T-time-call 911 as soon as signs and symptoms begin-DO NOT go       Back to bed or wait to see if you get better-TIME IS BRAIN. The discharge information has been reviewed with the patient. The patient verbalized understanding. Information obtained by :  I understand that if any problems occur once I am at home I am to contact my physician. I understand and acknowledge receipt of the instructions indicated above. Physician's or R.N.'s Signature                                                                  Date/Time                                                                                                                                              Patient or Representative Signature                                                          Date/Time

## 2019-12-06 NOTE — PROGRESS NOTES
Shift assessment complete. Alert and Oriented x3. Denies pain. No NV deficits noted. +2 pedal pulses. Surgical bandage clean, dry and intact. Bed in low and locked position. Call light within reach.

## 2019-12-06 NOTE — PROGRESS NOTES
Problem: Mobility Impaired (Adult and Pediatric)  Goal: *Acute Goals and Plan of Care (Insert Text)  Description  GOALS (1-4 days):  (1.)Ms. Ulysses Huo will move from supine to sit and sit to supine  in bed with STAND BY ASSIST.    (2.)Ms. Winn will transfer from bed to chair and chair to bed with STAND BY ASSIST using the least restrictive device. (3.)Ms. Winn will ambulate with STAND BY ASSIST for 150 feet with the least restrictive device. 12/6  (4.)Ms. Winn will ambulate up/down 4 steps with right railing with MINIMAL ASSIST with device as needed. Knows how  (5.)Ms. Winn will state/observe ESTHER precautions with 0 verbal cues. 12/6  ________________________________________________________________________________________________     Outcome: Progressing Towards Goal    PHYSICAL THERAPY JOINT CAMP ESTHER: Daily Note, Discharge and PM 12/6/2019  INPATIENT: Hospital Day: 2  Payor: Liz Hernandez / Plan: [address] / Product Type: PPO /      NAME/AGE/GENDER: Claudio Alonzo is a 69 y.o. female   PRIMARY DIAGNOSIS:  Osteoarthritis of left hip, unspecified osteoarthritis type [M16.12]   Procedure(s) and Anesthesia Type:     * LEFT TOTAL HIP ARTHROPLASTY - Spinal (Left)  ICD-10: Treatment Diagnosis:    · Pain in left hip (M25.552)  · Stiffness of Left Hip, Not elsewhere classified (M25.652)  · Difficulty in walking, Not elsewhere classified (R26.2)      ASSESSMENT:     Ms. Ulysses Hou presents with decreased functional mobility and gait as well as decreased rom and strength of left LE s/p left esther. She plans on going home with HHPT. 12/6 am performs TH exercise routine without any increase in pain. Increase in distance using RW with CGA and no LOB. Remain in the recliner with needs in reach. 12/6 pm walk to the gym and performs exercises without any problems. Walk back to room and remain in the recliner with needs in reach. All question answer and ready for D/C.     This section established at most recent assessment   PROBLEM LIST (Impairments causing functional limitations):  1. Decreased Strength  2. Decreased ADL/Functional Activities  3. Decreased Transfer Abilities  4. Decreased Ambulation Ability/Technique  5. Decreased Balance  6. Increased Pain  7. Decreased Activity Tolerance  8. Decreased Flexibility/Joint Mobility  9. Decreased [place] with Home Exercise Program  10. Decreased strength   INTERVENTIONS PLANNED: (Benefits and precautions of physical therapy have been discussed with the patient.)  1. bed mobility  2. gait training  3. home exercise program (HEP)  4. Range of Motion: active/assisted/passive  5. Therapeutic Activities  6. therapeutic exercise/strengthening  7. transfer training  8. Group Therapy     TREATMENT PLAN: Frequency/Duration: Follow patient BID for duration of hospital stay to address above goals. Rehabilitation Potential For Stated Goals: Good     RECOMMENDED REHABILITATION/EQUIPMENT: (at time of discharge pending progress): Continue Skilled Therapy and Home Health: Physical Therapy. HISTORY:   History of Present Injury/Illness (Reason for Referral):  S/p left esther  Past Medical History/Comorbidities:   Ms. Swathi Luong  has a past medical history of Chronic musculoskeletal pain, COPD, Discoloration of skin (07/2019), GERD (gastroesophageal reflux disease), GERD (gastroesophageal reflux disease), History of back surgery, History of colon polyps, History of left bundle branch block, History of tooth extraction, History of tubal ligation, Hypertension, Hypothyroidism, Menopause, Morbid obesity ([address] OA (osteoarthritis), Skin rash, and Visit for dental examination (02/2017).  She also has no past medical history of Aneurysm ([address] Arrhythmia, Asthma, Autoimmune disease ([address] CAD (coronary artery disease), Cancer ([address] Chronic kidney disease, Chronic pain, Coagulation disorder ([address] Diabetes ([address] Difficult intubation, Endocarditis, Heart failure ([address] Liver disease, Malignant hyperthermia due to anesthesia, Nausea & vomiting, Nicotine vapor product user, Non-nicotine vapor product user, Pseudocholinesterase deficiency, Psychiatric disorder, Rheumatic fever, Seizures ([facility] Sleep apnea, Stroke ([facility] or Thromboembolus ([facility] Ms. Eugenia Richardson  has a past surgical history that includes hx back surgery; hx heent; hx tubal ligation; pr colsc flx w/removal lesion by hot bx forceps (9/13/2013); pr colsc flx w/rmvl of tumor polyp lesion snare tq (12/19/2016); colonoscopy (N/A, 12/19/2016); hx breast biopsy (Right, 11/21/2017); and hx cataract removal (Bilateral). Social History/Living Environment:   Home Environment: Private residence  One/Two Story Residence: One story  Living Alone: No  Support Systems: Spouse/Significant Other/Partner  Patient Expects to be Discharged to[de-identified] Private residence  Current DME Used/Available at Home: None  Prior Level of Function/Work/Activity:  Using cane with gait   Number of Personal Factors/Comorbidities that affect the Plan of Care: 1-2: MODERATE COMPLEXITY   EXAMINATION:   Most Recent Physical Functioning:                                 Transfers  Sit to Stand: Stand-by assistance  Stand to Sit: Stand-by assistance  Bed to Chair: Stand-by assistance    Balance  Sitting: Intact  Standing: Intact; With support              Weight Bearing Status  Left Side Weight Bearing: As tolerated  Distance (ft): 308 Feet (ft)(x 2)  Assistive Device: Walker, rolling  Speed/Pati: Delayed  Step Length: Left shortened;Right shortened  Stance: Left decreased  Gait Abnormalities: Antalgic  Interventions: Safety awareness training     Braces/Orthotics:     Left Hip Cold  Type: Cold/ice pack      Body Structures Involved:  1. Bones  2. Joints  3. Muscles  4. Ligaments Body Functions Affected:  1. Movement Related Activities and Participation Affected:  1.  Mobility   Number of elements that affect the Plan of Care: 3: MODERATE COMPLEXITY   CLINICAL PRESENTATION: Presentation: Stable and uncomplicated: LOW COMPLEXITY   CLINICAL DECISION MAKIN[address] [place] [address] AM-PAC 6 Clicks   Basic Mobility Inpatient Short Form  How much difficulty does the patient currently have. .. Unable A Lot A Little None   1. Turning over in bed (including adjusting bedclothes, sheets and blankets)? [] 1   [] 2   [x] 3   [] 4   2. Sitting down on and standing up from a chair with arms ( e.g., wheelchair, bedside commode, etc.)   [] 1   [] 2   [x] 3   [] 4   3. Moving from lying on back to sitting on the side of the bed? [] 1   [] 2   [x] 3   [] 4   How much help from another person does the patient currently need. .. Total A Lot A Little None   4. Moving to and from a bed to a chair (including a wheelchair)? [] 1   [] 2   [x] 3   [] 4   5. Need to walk in hospital room? [] 1   [] 2   [x] 3   [] 4   6. Climbing 3-5 steps with a railing? [] 1   [x] 2   [] 3   [] 4   © , Trustees of [address] [place] [address], under license to [employer]. All rights reserved     Score:  Initial: 17 Most Recent: X (Date: -- )    Interpretation of Tool:  Represents activities that are increasingly more difficult (i.e. Bed mobility, Transfers, Gait). Medical Necessity:     · Patient is expected to demonstrate progress in   · strength, range of motion, and balance  ·  to   · decrease assistance required with exercises and functional mobility   · . Reason for Services/Other Comments:  · Patient   · continues to require present interventions due to patient's inability to perform exercises and functional mobility independently   · .    Use of outcome tool(s) and clinical judgement create a POC that gives a: Clear prediction of patient's progress: LOW COMPLEXITY            TREATMENT:   (In addition to Assessment/Re-Assessment sessions the following treatments were rendered)     Pre-treatment Symptoms/Complaints:  Agreeable for group  Pain Initial:   Pain Intensity 1: 0(same after therapy)  Post Session: Therapeutic Exercise: (45 Minutes(group therapy)):  Exercises per grid below to improve mobility and strength. Required minimal visual, verbal, and manual cues to promote proper body alignment, promote proper body posture, and promote proper body mechanics. Progressed range and repetitions as indicated. Gait Training (15 Minutes):  Gait training to improve and/or restore physical functioning as related to mobility. Ambulated 308 Feet (ft)(x 2) with   using a Walker, rolling and minimal Safety awareness training related to their hip position and motion to promote proper body alignment. Date:  12/5 Date:  12/6   Date:     ACTIVITY/EXERCISE AM PM AM PM AM PM   GROUP THERAPY  []  []  []  [x]  []  []   Ankle Pumps  10a 15 15     Quad Sets  10a 15 15     Gluteal Sets  10a 15 15     Hip ABd/ADduction  10aa 15 5     Straight Leg Raises         Knee Slides  10a 15 15     Short Arc Quads   15 15     Long Arc Quads    15     Chair Slides                  B = bilateral; AA = active assistive; A = active; P = passive      Treatment/Session Assessment:     Response to Treatment:  Pt. Tolerated group therapy    Education:  [x] Home Exercises  [x] Fall Precautions  [x] Hip Precautions [] D/C Instruction Review  [x] Hip Prosthesis Review  [x] Walker Management/Safety [] Adaptive Equipment as Needed       Interdisciplinary Collaboration:   o Physical Therapy Assistant  o Registered Nurse    After treatment position/precautions:   o Up in chair  o Bed/Chair-wheels locked  o Bed in low position  o Call light within reach    Compliance with Program/Exercises: Will assess as treatment progresses. No questions    Recommendations/Intent for next treatment session:  Treatment next visit will focus on increasing Ms. Winn's independence with bed mobility, transfers, gait training, strength/ROM exercises, modalities for pain, and patient education.       Total Treatment Duration:  PT Patient Time In/Time Out  Time In: 1300  Time Out: [address] Severa Saba, PTA

## 2019-12-06 NOTE — PROGRESS NOTES
Problem: Self Care Deficits Care Plan (Adult)  Goal: *Acute Goals and Plan of Care (Insert Text)  Description  GOALS:   DISCHARGE GOALS (in preparation for going home/rehab):  3 days  1. Ms. Marci Gallego will perform one lower body dressing activity with minimal assistance with adaptive equipment to demonstrate improved functional mobility and safety. 2.  Ms. Marci Gallego will perform one lower body bathing activity with minimal  assistance with adaptive equipment to demonstrate improved functional mobility and safety. 3.  Ms. Marci Gallego will perform toileting/toilet transfer with contact guard assistance with adaptive equipment to demonstrate improved functional mobility and safety. 4.  Ms. Marci Gallego will perform shower transfer with contact guard assistance with adaptive equipment to demonstrate improved functional mobility and safety. 5.  Ms. Marci Gallego will state CALVIN precautions with two verbal cues to demonstrate improved functional mobility and safety. Outcome: MET       JOINT CAMP OCCUPATIONAL THERAPY CALVIN: Daily Note, Discharge, Treatment Day: 1st and AM 12/6/2019  INPATIENT: Hospital Day: 2  Payor: Tita Doss / Plan: [facility] / Product Type: PPO /      NAME/AGE/GENDER: Jaqueline Ritchie is a 69 y.o. female   PRIMARY DIAGNOSIS:  Osteoarthritis of left hip, unspecified osteoarthritis type [M16.12]   Procedure(s) and Anesthesia Type:     * LEFT TOTAL HIP ARTHROPLASTY - Spinal (Left)  ICD-10: Treatment Diagnosis:    · Pain in left hip (M25.552)  · Stiffness of Left Hip, Not elsewhere classified (X54.188)      ASSESSMENT:     Ms. Marci Gallego is s/p left CALVIN and presents with decreased weight bearing on left LE and decreased independence with functional mobility and activities of daily living. Patient completed shower and dressing as charter below in ADL grid and is ambulating with rolling walker and supervision assist.  Patient has met 5/5 goals and plans to return home with good family support.   Family able to provide patient with appropriate level of assistance at this time. OT reviewed hip precautions throughout session. Patient instructed to call for assistance when needing to get up from recliner and all needs in reach. Patient verbalized understanding of call light. This section established at most recent assessment   PROBLEM LIST (Impairments causing functional limitations):  1. Decreased Strength  2. Decreased ADL/Functional Activities  3. Decreased Transfer Abilities  4. Increased Pain  5. Increased Fatigue  6. Decreased Flexibility/Joint Mobility  7. Decreased Knowledge of Precautions   INTERVENTIONS PLANNED: (Benefits and precautions of occupational therapy have been discussed with the patient.)  1. Activities of daily living training  2. Adaptive equipment training  3. Balance training  4. Clothing management  5. Donning&doffing training  6. Theraputic activity     TREATMENT PLAN: Frequency/Duration: Follow patient 1-2 times to address above goals. Rehabilitation Potential For Stated Goals: Good     RECOMMENDED REHABILITATION/EQUIPMENT: (at time of discharge pending progress): Continue Skilled Therapy. OCCUPATIONAL PROFILE AND HISTORY:   History of Present Injury/Illness (Reason for Referral): Pt presents this date s/p (left) CALVIN. Past Medical History/Comorbidities:   Ms. Codey Vogel  has a past medical history of Chronic musculoskeletal pain, COPD, Discoloration of skin (07/2019), GERD (gastroesophageal reflux disease), GERD (gastroesophageal reflux disease), History of back surgery, History of colon polyps, History of left bundle branch block, History of tooth extraction, History of tubal ligation, Hypertension, Hypothyroidism, Menopause, Morbid obesity ([address] OA (osteoarthritis), Skin rash, and Visit for dental examination (02/2017).  She also has no past medical history of Aneurysm ([address] Arrhythmia, Asthma, Autoimmune disease ([address] CAD (coronary artery disease), Cancer ([address] Chronic kidney disease, Chronic pain, Coagulation disorder ([facility] Diabetes ([address] Difficult intubation, Endocarditis, Heart failure ([address] Liver disease, Malignant hyperthermia due to anesthesia, Nausea & vomiting, Nicotine vapor product user, Non-nicotine vapor product user, Pseudocholinesterase deficiency, Psychiatric disorder, Rheumatic fever, Seizures ([address] Sleep apnea, Stroke ([address] or Thromboembolus ([facility] Ms. Inder Mallory  has a past surgical history that includes hx back surgery; hx heent; hx tubal ligation; pr colsc flx w/removal lesion by hot bx forceps (9/13/2013); pr colsc flx w/rmvl of tumor polyp lesion snare tq (12/19/2016); colonoscopy (N/A, 12/19/2016); hx breast biopsy (Right, 11/21/2017); and hx cataract removal (Bilateral). Social History/Living Environment:   Home Environment: Private residence  One/Two Story Residence: One story  Living Alone: No  Support Systems: Spouse/Significant Other/Partner  Patient Expects to be Discharged to[de-identified] Private residence  Current DME Used/Available at Home: None  Prior Level of Function/Work/Activity:  Independent      Number of Personal Factors/Comorbidities that affect the Plan of Care: Brief history (0):  LOW COMPLEXITY   ASSESSMENT OF OCCUPATIONAL PERFORMANCE[de-identified]   Most Recent Physical Functioning:   Balance  Sitting: Intact  Standing: Intact; With support                              Mental Status  Neurologic State: Alert                Basic ADLs (From Assessment) Complex ADLs (From Assessment)   Basic ADL  Feeding: Independent  Oral Facial Hygiene/Grooming: Supervision  Bathing: Moderate assistance  Type of Bath: Chlorhexidine (CHG), Full, Shower  Upper Body Dressing: Supervision  Lower Body Dressing:  Moderate assistance  Toileting: Supervision     Grooming/Bathing/Dressing Activities of Daily Living   Grooming  Grooming Assistance: Supervision(standing at sink)     Upper Body Bathing  Bathing Assistance: Supervision     Lower Body Bathing  Bathing Assistance: Supervision(verbal cues for tech, shower chair) Toileting  Toileting Assistance: Supervision(elevated seat, walker)   Upper Body Dressing Assistance  Dressing Assistance: Supervision Functional Transfers  Bathroom Mobility: Supervision/set up  Toilet Transfer : Supervision  Shower Transfer: Supervision  Cues: Verbal cues provided  Adaptive Equipment: Shower chair with back;Grab bars; Walker (comment)   Lower Body Dressing Assistance  Dressing Assistance: Supervision(verbal cues for tech) Bed/Mat Mobility  Sit to Stand: Contact guard assistance  Stand to Sit: Contact guard assistance  Bed to Chair: Contact guard assistance         Physical Skills Involved:  1. Range of Motion  2. Balance  3. Strength Cognitive Skills Affected (resulting in the inability to perform in a timely and safe manner): 1. none  Psychosocial Skills Affected:  1. Environmental Adaptation   Number of elements that affect the Plan of Care: 3-5:  MODERATE COMPLEXITY   CLINICAL DECISION MAKIN[address] [place] [address] AM-PAC 6 Clicks   Daily Activity Inpatient Short Form  How much help from another person does the patient currently need. .. Total A Lot A Little None   1. Putting on and taking off regular lower body clothing? [] 1   [x] 2   [] 3   [x] 4   2. Bathing (including washing, rinsing, drying)? [] 1   [] 2   [] 3   [x] 4   3. Toileting, which includes using toilet, bedpan or urinal?   [] 1   [] 2   [] 3   [x] 4   4. Putting on and taking off regular upper body clothing? [] 1   [] 2   [] 3   [x] 4   5. Taking care of personal grooming such as brushing teeth? [] 1   [] 2   [] 3   [x] 4   6. Eating meals? [] 1   [] 2   [] 3   [x] 4   © , Trustees of [address] [place] [id number], under license to [employer]. All rights reserved     Score:  Initial: 19 Most Recent:24 (Date: 2019 )    Interpretation of Tool:  Represents activities that are increasingly more difficult (i.e. Bed mobility, Transfers, Gait).      Use of outcome tool(s) and clinical judgement create a POC that gives a: LOW COMPLEXITY            TREATMENT:   (In addition to Assessment/Re-Assessment sessions the following treatments were rendered)     Pre-treatment Symptoms/Complaints:    Pain: Initial:      Post Session:  0     Self Care: (40 min): Procedure(s) (per grid) utilized to improve and/or restore self-care/home management as related to dressing, bathing, toileting and grooming. Required minimal verbal and   cueing to facilitate activities of daily living skills and compensatory activities. Treatment/Session Assessment:     Response to Treatment:  up to shower tolerated well. Education:  [] Home Exercises  [x] Fall Precautions  [x] Hip Precautions [] Going Home Video  [] Knee/Hip Prosthesis Review  [x] Walker Management/Safety [x] Adaptive Equipment as Needed       Interdisciplinary Collaboration:   o Physical Therapist  o Occupational Therapist  o Registered Nurse    After treatment position/precautions:   o Up in chair  o Bed/Chair-wheels locked  o Call light within reach  o RN notified     Compliance with Program/Exercises: Compliant all of the time. Recommendations/Intent for next treatment session:Pt doing well all goals met and will do well at home with support from . Patient will be discharged home with home health PT. No further Occupational Therapy warranted, will discharge Occupational Therapy services.         Total Treatment Duration:  OT Patient Time In/Time Out  Time In: 0910  Time Out: [address], OT

## 2019-12-06 NOTE — PROGRESS NOTES
Problem: Mobility Impaired (Adult and Pediatric)  Goal: *Acute Goals and Plan of Care (Insert Text)  Description  GOALS (1-4 days):  (1.)Ms. Darek Smith will move from supine to sit and sit to supine  in bed with STAND BY ASSIST.    (2.)Ms. Winn will transfer from bed to chair and chair to bed with STAND BY ASSIST using the least restrictive device. (3.)Ms. Winn will ambulate with STAND BY ASSIST for 150 feet with the least restrictive device. (4.)Ms. Winn will ambulate up/down 4 steps with right railing with MINIMAL ASSIST with device as needed. (5.)Ms. Winn will state/observe CALVIN precautions with 0 verbal cues. ________________________________________________________________________________________________     Outcome: Progressing Towards Goal    PHYSICAL THERAPY JOINT CAMP CALVIN: Daily Note and AM 12/6/2019  INPATIENT: Hospital Day: 2  Payor: Yesenia Gallardo / Plan: [facility] [employer] [facility] / Product Type: PPO /      NAME/AGE/GENDER: Verónica Martinez is a 69 y.o. female   PRIMARY DIAGNOSIS:  Osteoarthritis of left hip, unspecified osteoarthritis type [M16.12]   Procedure(s) and Anesthesia Type:     * LEFT TOTAL HIP ARTHROPLASTY - Spinal (Left)  ICD-10: Treatment Diagnosis:    · Pain in left hip (M25.552)  · Stiffness of Left Hip, Not elsewhere classified (M25.652)  · Difficulty in walking, Not elsewhere classified (R26.2)      ASSESSMENT:     Ms. Darek Smith presents with decreased functional mobility and gait as well as decreased rom and strength of left LE s/p left calvin. She plans on going home with HHPT. 12/6 am performs TH exercise routine without any increase in pain. Increase in distance using RW with CGA and no LOB. Remain in the recliner with needs in reach. This section established at most recent assessment   PROBLEM LIST (Impairments causing functional limitations):  1. Decreased Strength  2. Decreased ADL/Functional Activities  3. Decreased Transfer Abilities  4.  Decreased Ambulation Ability/Technique  5. Decreased Balance  6. Increased Pain  7. Decreased Activity Tolerance  8. Decreased Flexibility/Joint Mobility  9. Decreased [place] with Home Exercise Program  10. Decreased strength   INTERVENTIONS PLANNED: (Benefits and precautions of physical therapy have been discussed with the patient.)  1. bed mobility  2. gait training  3. home exercise program (HEP)  4. Range of Motion: active/assisted/passive  5. Therapeutic Activities  6. therapeutic exercise/strengthening  7. transfer training  8. Group Therapy     TREATMENT PLAN: Frequency/Duration: Follow patient BID for duration of hospital stay to address above goals. Rehabilitation Potential For Stated Goals: Good     RECOMMENDED REHABILITATION/EQUIPMENT: (at time of discharge pending progress): Continue Skilled Therapy and Home Health: Physical Therapy. HISTORY:   History of Present Injury/Illness (Reason for Referral):  S/p left esther  Past Medical History/Comorbidities:   Ms. Dina Armenta  has a past medical history of Chronic musculoskeletal pain, COPD, Discoloration of skin (07/2019), GERD (gastroesophageal reflux disease), GERD (gastroesophageal reflux disease), History of back surgery, History of colon polyps, History of left bundle branch block, History of tooth extraction, History of tubal ligation, Hypertension, Hypothyroidism, Menopause, Morbid obesity ([address] OA (osteoarthritis), Skin rash, and Visit for dental examination (02/2017).  She also has no past medical history of Aneurysm ([address] Arrhythmia, Asthma, Autoimmune disease ([address] CAD (coronary artery disease), Cancer ([address] Chronic kidney disease, Chronic pain, Coagulation disorder ([address] Diabetes ([address] Difficult intubation, Endocarditis, Heart failure ([address] Liver disease, Malignant hyperthermia due to anesthesia, Nausea & vomiting, Nicotine vapor product user, Non-nicotine vapor product user, Pseudocholinesterase deficiency, Psychiatric disorder, Rheumatic fever, Seizures ([facility] Sleep apnea, Stroke ([facility] [address] or Thromboembolus ([facility] [address] [facility] Ms. Luis Carlos Donohue  has a past surgical history that includes hx back surgery; hx heent; hx tubal ligation; pr colsc flx w/removal lesion by hot bx forceps (2013); pr colsc flx w/rmvl of tumor polyp lesion snare tq (2016); colonoscopy (N/A, 2016); hx breast biopsy (Right, 2017); and hx cataract removal (Bilateral). Social History/Living Environment:   Home Environment: Private residence  One/Two Story Residence: One story  Living Alone: No  Support Systems: Spouse/Significant Other/Partner  Patient Expects to be Discharged to[de-identified] Private residence  Current DME Used/Available at Home: None  Prior Level of Function/Work/Activity:  Using cane with gait   Number of Personal Factors/Comorbidities that affect the Plan of Care: 1-2: MODERATE COMPLEXITY   EXAMINATION:   Most Recent Physical Functioning:                                 Transfers  Sit to Stand: Contact guard assistance  Stand to Sit: Contact guard assistance  Bed to Chair: Contact guard assistance    Balance  Sitting: Intact  Standing: Pull to stand; With support              Weight Bearing Status  Left Side Weight Bearing: As tolerated  Distance (ft): 100 Feet (ft)  Assistive Device: Walker, rolling  Speed/Pati: Delayed  Step Length: Left shortened;Right shortened  Stance: Left decreased  Gait Abnormalities: Antalgic  Interventions: Safety awareness training     Braces/Orthotics:     Left Hip Cold  Type: Cold/ice pack      Body Structures Involved:  1. Bones  2. Joints  3. Muscles  4. Ligaments Body Functions Affected:  1. Movement Related Activities and Participation Affected:  1.  Mobility   Number of elements that affect the Plan of Care: 3: MODERATE COMPLEXITY   CLINICAL PRESENTATION:   Presentation: Stable and uncomplicated: LOW COMPLEXITY   CLINICAL DECISION MAKIN [facility] [address] [id number] AM-PAC 6 Clicks   Basic Mobility Inpatient Short Form  How much difficulty does the patient currently have. .. Unable A Lot A Little None   1. Turning over in bed (including adjusting bedclothes, sheets and blankets)? [] 1   [] 2   [x] 3   [] 4   2. Sitting down on and standing up from a chair with arms ( e.g., wheelchair, bedside commode, etc.)   [] 1   [] 2   [x] 3   [] 4   3. Moving from lying on back to sitting on the side of the bed? [] 1   [] 2   [x] 3   [] 4   How much help from another person does the patient currently need. .. Total A Lot A Little None   4. Moving to and from a bed to a chair (including a wheelchair)? [] 1   [] 2   [x] 3   [] 4   5. Need to walk in hospital room? [] 1   [] 2   [x] 3   [] 4   6. Climbing 3-5 steps with a railing? [] 1   [x] 2   [] 3   [] 4   © 2007, Trustees of [address] [place] [address], under license to [employer]. All rights reserved     Score:  Initial: 17 Most Recent: X (Date: -- )    Interpretation of Tool:  Represents activities that are increasingly more difficult (i.e. Bed mobility, Transfers, Gait). Medical Necessity:     · Patient is expected to demonstrate progress in   · strength, range of motion, and balance  ·  to   · decrease assistance required with exercises and functional mobility   · . Reason for Services/Other Comments:  · Patient   · continues to require present interventions due to patient's inability to perform exercises and functional mobility independently   · . Use of outcome tool(s) and clinical judgement create a POC that gives a: Clear prediction of patient's progress: LOW COMPLEXITY            TREATMENT:   (In addition to Assessment/Re-Assessment sessions the following treatments were rendered)     Pre-treatment Symptoms/Complaints:  agreeable  Pain Initial:   Pain Intensity 1: 2(about the same)  Post Session:  0     Therapeutic Exercise: (15 Minutes):  Exercises per grid below to improve mobility and strength.   Required minimal visual, verbal, and manual cues to promote proper body alignment, promote proper body posture, and promote proper body mechanics. Progressed range and repetitions as indicated. Gait Training (15 Minutes):  Gait training to improve and/or restore physical functioning as related to mobility. Ambulated 100 Feet (ft) with   using a Walker, rolling and minimal Safety awareness training related to their hip position and motion to promote proper body alignment. Date:  12/5 Date:  12/6   Date:     ACTIVITY/EXERCISE AM PM AM PM AM PM   GROUP THERAPY  []  []  []  []  []  []   Ankle Pumps  10a 15      Quad Sets  10a 15      Gluteal Sets  10a 15      Hip ABd/ADduction  10aa 15      Straight Leg Raises         Knee Slides  10a 15      Short Arc Quads   15      Long Arc Quads         Chair Slides                  B = bilateral; AA = active assistive; A = active; P = passive      Treatment/Session Assessment:     Response to Treatment:  Pt. Tolerated gait and exercises. Education:  [x] Home Exercises  [x] Fall Precautions  [x] Hip Precautions [] D/C Instruction Review  [x] Hip Prosthesis Review  [x] Walker Management/Safety [] Adaptive Equipment as Needed       Interdisciplinary Collaboration:   o Physical Therapy Assistant  o Registered Nurse    After treatment position/precautions:   o Up in chair  o Bed/Chair-wheels locked  o Bed in low position  o Call light within reach    Compliance with Program/Exercises: Will assess as treatment progresses. No questions    Recommendations/Intent for next treatment session:  Treatment next visit will focus on increasing Ms. Winn's independence with bed mobility, transfers, gait training, strength/ROM exercises, modalities for pain, and patient education.       Total Treatment Duration:  PT Patient Time In/Time Out  Time In: 1593  Time Out: 0805    Tesha Irizarry, PTA

## 2019-12-06 NOTE — PROGRESS NOTES
[address]  Face to Face Encounter    Patients Name: Vijay Dwyer    YOB: 1945    Ordering Physician: Taj Welch    Primary Diagnosis: Osteoarthritis of left hip, unspecified osteoarthritis type [M16.12]  Arthritis of left hip [M16.12]  S/p left CALVIN    Date of Face to Face:   12/5/19                                  Face to Face Encounter findings are related to primary reason for home care:   yes. 1. I certify that the patient needs intermittent care as follows: physical therapy: gait/stair training    2. I certify that this patient is homebound, that is: 1) patient requires the use of a walker device, special transportation, or assistance of another to leave the home; or 2) patient's condition makes leaving the home medically contraindicated; and 3) patient has a normal inability to leave the home and leaving the home requires considerable and taxing effort. Patient may leave the home for infrequent and short duration for medical reasons, and occasional absences for non-medical reasons. Homebound status is due to the following functional limitations: Patient's ambulation limited secondary to severe pain and requires the use of an assistive device and the assistance of a caregiver for safe completion. Patient with strength and ROM deficits limiting ambulation endurance requiring the use of an assistive device and the assistance of a caregiver. Patient deemed temporarily homebound secondary to increased risk for infection when leaving home and going out into the community. 3. I certify that this patient is under my care and that I, or a nurse practitioner or  [id number], or clinical nurse specialist, or certified nurse midwife, working with me, had a Face-to-Face Encounter that meets the physician Face-to-Face Encounter requirements.   The following are the clinical findings from the [address] [place] [address] encounter that support the need for skilled services and is a summary of the encounter: see hospital chart        Edu Aprilrobert, BSW  12/6/2019      THE FOLLOWING TO BE COMPLETED BY THE COMMUNITY PHYSICIAN:    I concur with the findings described above from the F2F encounter that this patient is homebound and in need of a skilled service.     Certifying Physician: _____________________________________      Printed Certifying Physician Name: _____________________________________    Date: _________________

## 2019-12-06 NOTE — PROGRESS NOTES
2019         Post Op day: 1 Day Post-Op   Admit Diagnosis: Osteoarthritis of left hip, unspecified osteoarthritis type [M16.12]  Arthritis of left hip [M16.12]  LAB:    Recent Results (from the past 24 hour(s))   HEMOGLOBIN    Collection Time: 19  7:23 PM   Result Value Ref Range    HGB 13.8 11.7 - 15.4 g/dL   HEMOGLOBIN    Collection Time: 19  3:41 AM   Result Value Ref Range    HGB 12.6 11.7 - 15.4 g/dL     Vital Signs:    Patient Vitals for the past 8 hrs:   BP Temp Pulse Resp SpO2   19 0710 120/76 97.7 °F (36.5 °C) 71 16 95 %   19 0340 129/81 97.4 °F (36.3 °C) 68 16 93 %     Temp (24hrs), Av.7 °F (36.5 °C), Min:97.2 °F (36.2 °C), Max:98.5 °F (36.9 °C)    Pain Control:   Pain Assessment  Pain Scale 1: Numeric (0 - 10)  Pain Intensity 1: 2  Pain Onset 1: ()  Pain Location 1: Hip  Pain Orientation 1: Left  Pain Description 1: Aching  Pain Intervention(s) 1: Medication (see MAR)  Subjective: Doing well, pain is well controlled, no complaints     Objective:  No Acute Distress, Alert and Oriented, Neurovascular exam is normal       Assessment:   Patient Active Problem List   Diagnosis Code    Acquired hypothyroidism E03.9    Essential hypertension with goal blood pressure less than 140/90 I10    Severe obesity (BMI 35.0-39. 9) with comorbidity ([facility] E66.01    Arthritis of left hip M16.12       Status Post Procedure(s) (LRB):  LEFT TOTAL HIP ARTHROPLASTY (Left)        Plan: Continue Physical Therapy, Monitor Hgb. ASA/SCDs for DVT prophylaxis. Anticipate d/c to home today.    Signed By: SIMÓN Mejia

## 2019-12-07 ENCOUNTER — HOME CARE VISIT (OUTPATIENT)
Dept: SCHEDULING | Facility: HOME HEALTH | Age: 74
End: 2019-12-07
Payer: COMMERCIAL

## 2019-12-07 VITALS
TEMPERATURE: 98.1 F | RESPIRATION RATE: 16 BRPM | DIASTOLIC BLOOD PRESSURE: 66 MMHG | HEART RATE: 70 BPM | SYSTOLIC BLOOD PRESSURE: 140 MMHG

## 2019-12-07 PROCEDURE — 400013 HH SOC

## 2019-12-07 PROCEDURE — 3331090001 HH PPS REVENUE CREDIT

## 2019-12-07 PROCEDURE — G0151 HHCP-SERV OF PT,EA 15 MIN: HCPCS

## 2019-12-07 PROCEDURE — 3331090002 HH PPS REVENUE DEBIT

## 2019-12-08 PROCEDURE — 3331090001 HH PPS REVENUE CREDIT

## 2019-12-08 PROCEDURE — 3331090002 HH PPS REVENUE DEBIT

## 2019-12-09 PROCEDURE — 3331090002 HH PPS REVENUE DEBIT

## 2019-12-09 PROCEDURE — 3331090001 HH PPS REVENUE CREDIT

## 2019-12-10 ENCOUNTER — HOME CARE VISIT (OUTPATIENT)
Dept: SCHEDULING | Facility: HOME HEALTH | Age: 74
End: 2019-12-10
Payer: COMMERCIAL

## 2019-12-10 VITALS
SYSTOLIC BLOOD PRESSURE: 148 MMHG | DIASTOLIC BLOOD PRESSURE: 74 MMHG | HEART RATE: 99 BPM | TEMPERATURE: 98.4 F | RESPIRATION RATE: 18 BRPM

## 2019-12-10 PROCEDURE — 3331090001 HH PPS REVENUE CREDIT

## 2019-12-10 PROCEDURE — G0157 HHC PT ASSISTANT EA 15: HCPCS

## 2019-12-10 PROCEDURE — 3331090002 HH PPS REVENUE DEBIT

## 2019-12-11 PROCEDURE — 3331090002 HH PPS REVENUE DEBIT

## 2019-12-11 PROCEDURE — 3331090001 HH PPS REVENUE CREDIT

## 2019-12-12 ENCOUNTER — HOME CARE VISIT (OUTPATIENT)
Dept: SCHEDULING | Facility: HOME HEALTH | Age: 74
End: 2019-12-12
Payer: COMMERCIAL

## 2019-12-12 VITALS
DIASTOLIC BLOOD PRESSURE: 72 MMHG | HEART RATE: 70 BPM | RESPIRATION RATE: 18 BRPM | TEMPERATURE: 98.3 F | SYSTOLIC BLOOD PRESSURE: 160 MMHG

## 2019-12-12 PROCEDURE — 3331090001 HH PPS REVENUE CREDIT

## 2019-12-12 PROCEDURE — 3331090002 HH PPS REVENUE DEBIT

## 2019-12-12 PROCEDURE — G0157 HHC PT ASSISTANT EA 15: HCPCS

## 2019-12-13 ENCOUNTER — HOME CARE VISIT (OUTPATIENT)
Dept: SCHEDULING | Facility: HOME HEALTH | Age: 74
End: 2019-12-13
Payer: COMMERCIAL

## 2019-12-13 PROCEDURE — 3331090002 HH PPS REVENUE DEBIT

## 2019-12-13 PROCEDURE — 3331090001 HH PPS REVENUE CREDIT

## 2019-12-13 PROCEDURE — G0157 HHC PT ASSISTANT EA 15: HCPCS

## 2019-12-14 PROCEDURE — 3331090001 HH PPS REVENUE CREDIT

## 2019-12-14 PROCEDURE — 3331090002 HH PPS REVENUE DEBIT

## 2019-12-15 ENCOUNTER — HOME CARE VISIT (OUTPATIENT)
Dept: SCHEDULING | Facility: HOME HEALTH | Age: 74
End: 2019-12-15
Payer: COMMERCIAL

## 2019-12-15 VITALS
TEMPERATURE: 97 F | SYSTOLIC BLOOD PRESSURE: 150 MMHG | DIASTOLIC BLOOD PRESSURE: 88 MMHG | HEART RATE: 75 BPM | RESPIRATION RATE: 17 BRPM

## 2019-12-15 VITALS
HEART RATE: 75 BPM | SYSTOLIC BLOOD PRESSURE: 154 MMHG | RESPIRATION RATE: 18 BRPM | TEMPERATURE: 97.4 F | DIASTOLIC BLOOD PRESSURE: 72 MMHG

## 2019-12-15 PROCEDURE — G0157 HHC PT ASSISTANT EA 15: HCPCS

## 2019-12-15 PROCEDURE — 3331090002 HH PPS REVENUE DEBIT

## 2019-12-15 PROCEDURE — 3331090001 HH PPS REVENUE CREDIT

## 2019-12-16 ENCOUNTER — HOME CARE VISIT (OUTPATIENT)
Dept: SCHEDULING | Facility: HOME HEALTH | Age: 74
End: 2019-12-16
Payer: COMMERCIAL

## 2019-12-16 VITALS
SYSTOLIC BLOOD PRESSURE: 148 MMHG | TEMPERATURE: 97.1 F | RESPIRATION RATE: 16 BRPM | DIASTOLIC BLOOD PRESSURE: 80 MMHG | HEART RATE: 74 BPM

## 2019-12-16 PROCEDURE — G0157 HHC PT ASSISTANT EA 15: HCPCS

## 2019-12-16 PROCEDURE — 3331090002 HH PPS REVENUE DEBIT

## 2019-12-16 PROCEDURE — 3331090001 HH PPS REVENUE CREDIT

## 2019-12-17 PROCEDURE — 3331090001 HH PPS REVENUE CREDIT

## 2019-12-17 PROCEDURE — 3331090002 HH PPS REVENUE DEBIT

## 2019-12-18 PROCEDURE — 3331090002 HH PPS REVENUE DEBIT

## 2019-12-18 PROCEDURE — 3331090001 HH PPS REVENUE CREDIT

## 2019-12-19 ENCOUNTER — HOME CARE VISIT (OUTPATIENT)
Dept: HOME HEALTH SERVICES | Facility: HOME HEALTH | Age: 74
End: 2019-12-19
Payer: COMMERCIAL

## 2019-12-19 VITALS
DIASTOLIC BLOOD PRESSURE: 80 MMHG | TEMPERATURE: 97.1 F | HEART RATE: 75 BPM | SYSTOLIC BLOOD PRESSURE: 150 MMHG | RESPIRATION RATE: 16 BRPM

## 2019-12-19 PROCEDURE — A4649 SURGICAL SUPPLIES: HCPCS

## 2019-12-19 PROCEDURE — G0157 HHC PT ASSISTANT EA 15: HCPCS

## 2019-12-19 PROCEDURE — 3331090001 HH PPS REVENUE CREDIT

## 2019-12-19 PROCEDURE — 3331090002 HH PPS REVENUE DEBIT

## 2019-12-20 PROCEDURE — 3331090001 HH PPS REVENUE CREDIT

## 2019-12-20 PROCEDURE — 3331090002 HH PPS REVENUE DEBIT

## 2019-12-21 PROCEDURE — 3331090002 HH PPS REVENUE DEBIT

## 2019-12-21 PROCEDURE — 3331090001 HH PPS REVENUE CREDIT

## 2019-12-22 PROCEDURE — 3331090002 HH PPS REVENUE DEBIT

## 2019-12-22 PROCEDURE — 3331090001 HH PPS REVENUE CREDIT

## 2019-12-23 ENCOUNTER — HOME CARE VISIT (OUTPATIENT)
Dept: SCHEDULING | Facility: HOME HEALTH | Age: 74
End: 2019-12-23
Payer: COMMERCIAL

## 2019-12-23 VITALS
TEMPERATURE: 97.8 F | DIASTOLIC BLOOD PRESSURE: 70 MMHG | RESPIRATION RATE: 16 BRPM | HEART RATE: 70 BPM | SYSTOLIC BLOOD PRESSURE: 130 MMHG

## 2019-12-23 PROCEDURE — 3331090001 HH PPS REVENUE CREDIT

## 2019-12-23 PROCEDURE — 3331090002 HH PPS REVENUE DEBIT

## 2019-12-23 PROCEDURE — G0157 HHC PT ASSISTANT EA 15: HCPCS

## 2019-12-24 ENCOUNTER — HOME CARE VISIT (OUTPATIENT)
Dept: SCHEDULING | Facility: HOME HEALTH | Age: 74
End: 2019-12-24
Payer: COMMERCIAL

## 2019-12-24 VITALS
RESPIRATION RATE: 16 BRPM | SYSTOLIC BLOOD PRESSURE: 152 MMHG | DIASTOLIC BLOOD PRESSURE: 70 MMHG | TEMPERATURE: 98.1 F | HEART RATE: 70 BPM

## 2019-12-24 PROCEDURE — G0151 HHCP-SERV OF PT,EA 15 MIN: HCPCS

## 2019-12-24 PROCEDURE — 3331090001 HH PPS REVENUE CREDIT

## 2019-12-24 PROCEDURE — 3331090002 HH PPS REVENUE DEBIT

## 2019-12-25 PROCEDURE — 3331090002 HH PPS REVENUE DEBIT

## 2019-12-25 PROCEDURE — 3331090001 HH PPS REVENUE CREDIT

## 2020-01-24 NOTE — PROGRESS NOTES
Called pt to verify time and location of procedure. Pt instructed to arrive at 0630 to hospital for 0800 procedure and is to have  present.

## 2020-01-27 ENCOUNTER — HOSPITAL ENCOUNTER (OUTPATIENT)
Age: 75
Setting detail: OUTPATIENT SURGERY
Discharge: HOME | End: 2020-01-27
Attending: SURGERY | Admitting: SURGERY
Payer: MEDICARE

## 2020-01-27 ENCOUNTER — ANESTHESIA EVENT (OUTPATIENT)
Dept: ENDOSCOPY | Age: 75
End: 2020-01-27
Payer: MEDICARE

## 2020-01-27 ENCOUNTER — ANESTHESIA (OUTPATIENT)
Dept: ENDOSCOPY | Age: 75
End: 2020-01-27
Payer: MEDICARE

## 2020-01-27 VITALS
RESPIRATION RATE: 16 BRPM | SYSTOLIC BLOOD PRESSURE: 130 MMHG | TEMPERATURE: 98.6 F | OXYGEN SATURATION: 98 % | DIASTOLIC BLOOD PRESSURE: 57 MMHG | HEART RATE: 51 BPM

## 2020-01-27 PROCEDURE — 74011000250 HC RX REV CODE- 250: Performed by: NURSE ANESTHETIST, CERTIFIED REGISTERED

## 2020-01-27 PROCEDURE — 76040000025: Performed by: SURGERY

## 2020-01-27 PROCEDURE — 76060000032 HC ANESTHESIA 0.5 TO 1 HR: Performed by: SURGERY

## 2020-01-27 PROCEDURE — 74011250636 HC RX REV CODE- 250/636: Performed by: NURSE ANESTHETIST, CERTIFIED REGISTERED

## 2020-01-27 PROCEDURE — 74011250636 HC RX REV CODE- 250/636: Performed by: SURGERY

## 2020-01-27 RX ORDER — PROPOFOL 10 MG/ML
INJECTION, EMULSION INTRAVENOUS AS NEEDED
Status: DISCONTINUED | OUTPATIENT
Start: 2020-01-27 | End: 2020-01-27 | Stop reason: HOSPADM

## 2020-01-27 RX ORDER — PROPOFOL 10 MG/ML
INJECTION, EMULSION INTRAVENOUS
Status: DISCONTINUED | OUTPATIENT
Start: 2020-01-27 | End: 2020-01-27 | Stop reason: HOSPADM

## 2020-01-27 RX ORDER — LIDOCAINE HYDROCHLORIDE 20 MG/ML
INJECTION, SOLUTION EPIDURAL; INFILTRATION; INTRACAUDAL; PERINEURAL AS NEEDED
Status: DISCONTINUED | OUTPATIENT
Start: 2020-01-27 | End: 2020-01-27 | Stop reason: HOSPADM

## 2020-01-27 RX ORDER — SODIUM CHLORIDE, SODIUM LACTATE, POTASSIUM CHLORIDE, CALCIUM CHLORIDE 600; 310; 30; 20 MG/100ML; MG/100ML; MG/100ML; MG/100ML
1000 INJECTION, SOLUTION INTRAVENOUS CONTINUOUS
Status: DISCONTINUED | OUTPATIENT
Start: 2020-01-27 | End: 2020-01-27 | Stop reason: HOSPADM

## 2020-01-27 RX ADMIN — PROPOFOL 160 MCG/KG/MIN: 10 INJECTION, EMULSION INTRAVENOUS at 08:03

## 2020-01-27 RX ADMIN — SODIUM CHLORIDE, SODIUM LACTATE, POTASSIUM CHLORIDE, AND CALCIUM CHLORIDE 1000 ML: 600; 310; 30; 20 INJECTION, SOLUTION INTRAVENOUS at 07:30

## 2020-01-27 RX ADMIN — PROPOFOL 70 MG: 10 INJECTION, EMULSION INTRAVENOUS at 08:03

## 2020-01-27 RX ADMIN — LIDOCAINE HYDROCHLORIDE 40 MG: 20 INJECTION, SOLUTION EPIDURAL; INFILTRATION; INTRACAUDAL; PERINEURAL at 08:03

## 2020-01-27 RX ADMIN — PHENYLEPHRINE HYDROCHLORIDE 200 MCG: 10 INJECTION INTRAVENOUS at 08:24

## 2020-01-27 RX ADMIN — PROPOFOL 20 MG: 10 INJECTION, EMULSION INTRAVENOUS at 08:04

## 2020-01-27 RX ADMIN — PHENYLEPHRINE HYDROCHLORIDE 100 MCG: 10 INJECTION INTRAVENOUS at 08:12

## 2020-01-27 RX ADMIN — PROPOFOL 10 MG: 10 INJECTION, EMULSION INTRAVENOUS at 08:05

## 2020-01-27 RX ADMIN — PROPOFOL 20 MG: 10 INJECTION, EMULSION INTRAVENOUS at 08:09

## 2020-01-27 NOTE — PROCEDURES
Procedure in Detail:  Informed consent was obtained for the procedure. The patient was placed in the left lateral decubitus position and sedation was induced by anesthesia. The scope was inserted into the rectum and advanced under direct vision to the cecum, which was identified by the ileocecal valve and appendiceal orifice. The quality of the colonic preparation was excellent. A careful inspection was made as the colonoscope was withdrawn, including a retroflexed view of the rectum; findings and interventions are described below. Appropriate photodocumentation was obtained. Findings:   ANUS: Anal exam reveals no masses or hemorrhoids, sphincter tone is normal.   RECTUM: Rectal exam reveals no masses or hemorrhoids. SIGMOID COLON: The mucosa is normal with good vascular pattern and without ulcers, diverticula, and polyps. DESCENDING COLON: The mucosa is normal with good vascular pattern and without ulcers, diverticula, and polyps. SPLENIC FLEXURE: The splenic flexure is normal.   TRANSVERSE COLON: The mucosa is normal with good vascular pattern and without ulcers, diverticula, and polyps. HEPATIC FLEXURE: The hepatic flexure is normal.   ASCENDING COLON: The mucosa is normal with good vascular pattern and without ulcers, diverticula, and polyps. CECUM: The appendiceal orifice appears normal. The ileocecal valve appears normal.   TERMINAL ILEUM: The terminal ileum was not entered. Specimens: No specimens were collected. Complications: None; patient tolerated the procedure well. \    EBL - none    Recommendations:   - Repeat colonoscopy in 5 years. (due to polyp Hx)     Signed By: Emely Díaz MD                        January 27, 2020

## 2020-01-27 NOTE — ANESTHESIA PREPROCEDURE EVALUATION
Relevant Problems   No relevant active problems       Anesthetic History               Review of Systems / Medical History  Patient summary reviewed, nursing notes reviewed and pertinent labs reviewed    Pulmonary              Pertinent negatives: No COPD (Patient denies COPD)     Neuro/Psych              Cardiovascular    Hypertension: well controlled              Exercise tolerance: >4 METS  Comments: H/O LBBB   GI/Hepatic/Renal     GERD: well controlled           Endo/Other      Hypothyroidism: well controlled  Obesity and arthritis     Other Findings            Physical Exam    Airway  Mallampati: II  TM Distance: 4 - 6 cm  Neck ROM: normal range of motion   Mouth opening: Normal     Cardiovascular  Regular rate and rhythm,  S1 and S2 normal,  no murmur, click, rub, or gallop             Dental    Dentition: Upper partial plate     Pulmonary  Breath sounds clear to auscultation               Abdominal         Other Findings            Anesthetic Plan    ASA: 2  Anesthesia type: total IV anesthesia          Induction: Intravenous  Anesthetic plan and risks discussed with: Patient

## 2020-01-27 NOTE — ANESTHESIA POSTPROCEDURE EVALUATION
Procedure(s):  COLONOSCOPY/BMI 37.    total IV anesthesia    Anesthesia Post Evaluation      Multimodal analgesia: multimodal analgesia used between 6 hours prior to anesthesia start to PACU discharge  Patient location during evaluation: PACU  Patient participation: complete - patient participated  Level of consciousness: awake and alert  Pain management: adequate  Airway patency: patent  Anesthetic complications: no  Cardiovascular status: acceptable  Respiratory status: acceptable  Hydration status: acceptable  Post anesthesia nausea and vomiting:  none      Vitals Value Taken Time   /52 1/27/2020  8:33 AM   Temp 37 °C (98.6 °F) 1/27/2020  8:33 AM   Pulse 74 1/27/2020  8:35 AM   Resp 14 1/27/2020  8:33 AM   SpO2 96 % 1/27/2020  8:35 AM   Vitals shown include unvalidated device data.

## 2020-01-27 NOTE — DISCHARGE INSTRUCTIONS
Stephanie Jackson M.D.  [phone]    Instructions following colonoscopy:    ACTIVITY:   Resume usual, basic activities around the house today.  You may be light-headed or sleepy from anesthesia, so be careful going up and down stairs.  Avoid driving, operating machinery, or signing documents for 24 hours. DIET:   No restriction. Please note, some people may have nausea or cramps after this procedure which can result in an upset stomach after eating.  Many people have loose stools or diarrhea immediately after colonoscopy. It is also not uncommon to not have a bowel movement for 2-3 days. PAIN:   Some cramping or gas pain is normal after colonoscopy. However, if you experience worsening pain over the course of the day, or pain with associated fever please call the office immediately      [address] IF:   You have a temperature higher than 101.5° Fahrenheit for more than 6 hours.  You have severe nausea or vomiting not relieved by medication; or diarrhea. If you take a blood thinning medicine resume it: Aspirin as previously prescribed. Otherwise, continue home medications as previously prescribed. *Repeat colonoscopy in 5 years. (due to polyp Hx)*

## 2020-01-27 NOTE — H&P
History and Physical      Patient: Lawrence Magaña    Physician: Joe Talbert MD    Referring Physician: Katy Woods MD    Chief Complaint: For colonoscopy    History of Present Illness: Pt presents for colonoscopy. Had TA x 3 in 2013, and TA x 2 in 2016 with one of those >10mm.     History:  Past Medical History:   Diagnosis Date    Chronic musculoskeletal pain     COPD     pt denies- no inhalers    Discoloration of skin 07/2019    abdomen down to upper thighs; does not itch; pcp aware    GERD (gastroesophageal reflux disease)     at night, takes otc meds prn    GERD (gastroesophageal reflux disease)     History of back surgery     History of colon polyps     History of left bundle branch block     History of tooth extraction     History of tubal ligation     Hypertension     controlled with medications    Hypothyroidism     Menopause     Morbid obesity ([address]     OA (osteoarthritis)     back, fingers right hand    Skin rash     diagnosed with \"prickly heat rash\" 7/19; given Kenalog cream    Visit for dental examination 02/2017    every 3 months     Past Surgical History:   Procedure Laterality Date    COLONOSCOPY N/A 12/19/2016    COLONOSCOPY / BMI 30 performed [facility] Joe [facility]bert [facility] ENDOSCOPY    HX BACK SURGERY      L5-S1 discectomy    HX BREAST BIOPSY Right 11/21/2017    benign    HX CATARACT REMOVAL Bilateral     HX HEENT      tooth removed    HX HIP REPLACEMENT Left     HX TUBAL LIGATION      SD COLSC FLX W/REMOVAL LESION BY HOT BX FORCEPS  9/13/2013    colon polyps    SD COLSC FLX W/RMVL OF TUMOR POLYP LESION SNARE TQ  12/19/2016           Social History     Socioeconomic History    Marital status:      Spouse name: Not on file    Number of children: Not on file    Years of education: Not on file    Highest education level: Not on file   Tobacco Use    Smoking status: Former Smoker     Packs/day: 2.00     Years: 40.00     Pack years: 80.00     Last attempt to quit: 2003     Years since quittin.4    Smokeless tobacco: Never Used   Substance and Sexual Activity    Alcohol use: Yes     Alcohol/week: 1.7 standard drinks     Types: 2 Cans of beer per week     Comment: social, 3 or less per week    Drug use: No      Family History   Problem Relation Age of Onset    Hypertension Mother     Stroke Mother     Alcohol abuse Father          from alcoholism    Osteoporosis Sister     Other Sister         chronic pain    Alcohol abuse Brother     Colon Cancer Neg Hx        Medications:   Prior to Admission medications    Medication Sig Start Date End Date Taking? Authorizing Provider   ibuprofen 200 mg cap Take  by mouth. Yes Provider, Historical   olmesartan (BENICAR) 40 mg tablet Take 1 Tab by mouth daily. Indications: high blood pressure  Patient taking differently: Take 40 mg by mouth nightly. Indications: high blood pressure 20  Yes Randall Allison MD   amLODIPine (NORVASC) 10 mg tablet Take 1 Tab by mouth daily. Patient taking differently: Take 10 mg by mouth nightly. 20  Yes Randall Allison MD   metoprolol succinate (TOPROL-XL) 200 mg XL tablet Take 1 Tab by mouth daily. 20  Yes Randall Allison MD   levothyroxine (SYNTHROID) 175 mcg tablet Take 1 Tab by mouth Daily (before breakfast). 20  Yes Randall Allison MD   omeprazole (PRILOSEC) 20 mg capsule Take 20 mg by mouth daily. Indications: gastroesophageal reflux disease   Yes Provider, Historical   aspirin delayed-release 81 mg tablet Take 1 Tab by mouth every twelve (12) hours every twelve (12) hours. 19  Yes Karly Lee PA   calcium 500 mg Tab Take 1,200 mg by mouth daily. With vitamin D  Indications: post-menopausal osteoporosis prevention   Yes Provider, Historical   triamcinolone acetonide (KENALOG) 0.1 % topical cream Apply  to affected area two (2) times a day.  use thin layer  Patient taking differently: Apply  to affected area two (2) times daily as needed. use thin layer~for rash on neck 7/3/19   Megan Martin MD       Allergies: No Known Allergies    Physical Exam:     Vital Signs:   Visit Vitals  /63   Pulse 70   Temp 98.1 °F (36.7 °C)   Resp 16   SpO2 95%   Breastfeeding No     .    General: no distress      Heart: regular   Lungs: unlabored   Abdominal: soft   Neurological: Grossly normal        Findings/Diagnosis: Encounter for colorectal cancer screening due to personal history of adenomatous polyp(s)     Plan of Care/Planned Procedure: Colonoscopy, possible polypectomy. Pt/designee has reviewed the colonoscopy information sheet. Any questions have been discussed. They agree to proceed.       Signed:  Clorinda Rinne, MD   1/27/2020

## 2020-01-28 ENCOUNTER — APPOINTMENT (OUTPATIENT)
Dept: PHYSICAL THERAPY | Age: 75
End: 2020-01-28

## 2020-12-16 ENCOUNTER — HOSPITAL ENCOUNTER (OUTPATIENT)
Dept: GENERAL RADIOLOGY | Age: 75
Discharge: HOME | End: 2020-12-16

## 2020-12-16 DIAGNOSIS — M25.561 ACUTE PAIN OF RIGHT KNEE: ICD-10-CM

## 2020-12-16 NOTE — PROGRESS NOTES
Called with patient regards to her knee x-ray. Discussed moderate to severe joint space loss and degenerative changes with osteophyte. Discussed options for possible orthopedic referral for further treatment if her NSAID and muscle relaxer does not help. Patient to call office back to let us know if any pain does not improve.

## 2021-01-06 ENCOUNTER — HOSPITAL ENCOUNTER (OUTPATIENT)
Dept: MAMMOGRAPHY | Age: 76
Discharge: HOME | End: 2021-01-06
Attending: FAMILY MEDICINE

## 2021-01-06 DIAGNOSIS — Z12.31 SCREENING MAMMOGRAM, ENCOUNTER FOR: ICD-10-CM

## (undated) DEVICE — SUTURE MCRYL SZ 2-0 L27IN ABSRB UD CP-1 1 L36MM 1/2 CIR REV Y266H

## (undated) DEVICE — SUTURE ETHBND EXCEL SZ 2 L27IN NONABSORBABLE GRN WHT MO-7 D7485

## (undated) DEVICE — SYR 5ML 1/5 GRAD LL NSAF LF --

## (undated) DEVICE — KENDALL RADIOLUCENT FOAM MONITORING ELECTRODE RECTANGULAR SHAPE: Brand: KENDALL

## (undated) DEVICE — CONNECTOR TBNG OD5-7MM O2 END DISP

## (undated) DEVICE — SYR 3ML LL TIP 1/10ML GRAD --

## (undated) DEVICE — SOLUTION IV 1000ML 0.9% SOD CHL

## (undated) DEVICE — HANDPIECE SET WITH COAXIAL HIGH FLOW TIP AND SUCTION TUBE: Brand: INTERPULSE

## (undated) DEVICE — NDL PRT INJ NSAF BLNT 18GX1.5 --

## (undated) DEVICE — SURGICAL PROCEDURE PACK TOT HIP CDS

## (undated) DEVICE — DRAPE,HIP,W/POUCHES,STERILE: Brand: MEDLINE

## (undated) DEVICE — SOLUTION IRRIG 3000ML 0.9% SOD CHL FLX CONT 0797208] ICU MEDICAL INC]

## (undated) DEVICE — SUTURE PDS II SZ 1 L96IN ABSRB VLT TP-1 L65MM 1/2 CIR Z880G

## (undated) DEVICE — 3M™ STERI-DRAPE™ INCISE DRAPE, XL 1051: Brand: STERI-DRAPE™

## (undated) DEVICE — STOCKINETTE TUBE 6X48 -- MEDICHOICE

## (undated) DEVICE — STRYKER PERFORMANCE SERIES SAGITTAL BLADE: Brand: STRYKER PERFORMANCE SERIES

## (undated) DEVICE — TRAY PREP DRY W/ PREM GLV 2 APPL 6 SPNG 2 UNDPD 1 OVERWRAP

## (undated) DEVICE — SYR LR LCK 1ML GRAD NSAF 30ML --

## (undated) DEVICE — GOWN,AURORA,FABRIC-REINFORCED,2XL: Brand: MEDLINE

## (undated) DEVICE — SUTURE PDS II SZ 1 L36IN ABSRB VLT L48MM CTX 1/2 CIR Z371T

## (undated) DEVICE — CANNULA NSL ORAL AD FOR CAPNOFLEX CO2 O2 AIRLFE

## (undated) DEVICE — DRAPE,U/SHT,SPLIT,FILM,60X84,STERILE: Brand: MEDLINE

## (undated) DEVICE — SYR 50ML LR LCK 1ML GRAD NSAF --

## (undated) DEVICE — REM POLYHESIVE ADULT PATIENT RETURN ELECTRODE: Brand: VALLEYLAB

## (undated) DEVICE — T4 HOOD

## (undated) DEVICE — DRAPE,TOP,102X53,STERILE: Brand: MEDLINE

## (undated) DEVICE — (D)PREP SKN CHLRAPRP APPL 26ML -- CONVERT TO ITEM 371833